# Patient Record
Sex: FEMALE | Race: BLACK OR AFRICAN AMERICAN | NOT HISPANIC OR LATINO | Employment: FULL TIME | URBAN - METROPOLITAN AREA
[De-identification: names, ages, dates, MRNs, and addresses within clinical notes are randomized per-mention and may not be internally consistent; named-entity substitution may affect disease eponyms.]

---

## 2023-09-14 LAB
ALBUMIN (G/DL) IN SER/PLAS: 3.9 G/DL (ref 3.4–5)
ANION GAP IN SER/PLAS: 14 MMOL/L (ref 10–20)
CALCIDIOL (25 OH VITAMIN D3) (NG/ML) IN SER/PLAS: 50 NG/ML
CALCIUM (MG/DL) IN SER/PLAS: 9.2 MG/DL (ref 8.6–10.6)
CARBON DIOXIDE, TOTAL (MMOL/L) IN SER/PLAS: 30 MMOL/L (ref 21–32)
CHLORIDE (MMOL/L) IN SER/PLAS: 101 MMOL/L (ref 98–107)
CREATININE (MG/DL) IN SER/PLAS: 0.86 MG/DL (ref 0.5–1.05)
DEHYDROEPIANDROSTERONE SULFATE (DHEA-S) (UG/DL) IN SER/: 68 UG/DL (ref 12–379)
ESTIMATED AVERAGE GLUCOSE FOR HBA1C: 114 MG/DL
ESTRADIOL (PG/ML) IN SER/PLAS: 52 PG/ML
FASTING GLUCOSE (MG/DL) IN SER/PLAS: 69 MG/DL (ref 74–99)
FOLLITROPIN (IU/L) IN SER/PLAS: 5.7 IU/L
GFR FEMALE: 83 ML/MIN/1.73M2
GLUCOSE (MG/DL) IN SER/PLAS: 73 MG/DL (ref 74–99)
HEMOGLOBIN A1C/HEMOGLOBIN TOTAL IN BLOOD: 5.6 %
LUTEINIZING HORMONE (IU/ML) IN SER/PLAS: 2.2 IU/L
PHOSPHATE (MG/DL) IN SER/PLAS: 4.8 MG/DL (ref 2.5–4.9)
POTASSIUM (MMOL/L) IN SER/PLAS: 3.5 MMOL/L (ref 3.5–5.3)
SODIUM (MMOL/L) IN SER/PLAS: 141 MMOL/L (ref 136–145)
THYROTROPIN (MIU/L) IN SER/PLAS BY DETECTION LIMIT <= 0.05 MIU/L: 0.91 MIU/L (ref 0.44–3.98)
THYROXINE (T4) FREE (NG/DL) IN SER/PLAS: 1.53 NG/DL (ref 0.78–1.48)
TRIIODOTHYRONINE (T3) (NG/DL) IN SER/PLAS: 139 NG/DL (ref 60–200)
UREA NITROGEN (MG/DL) IN SER/PLAS: 14 MG/DL (ref 6–23)

## 2023-09-16 LAB
THYROGLOBULIN AB (IU/ML) IN SER/PLAS: <0.9 IU/ML (ref 0–4)
THYROGLOBULIN LC-MS/MS: ABNORMAL NG/ML (ref 1.3–31.8)
THYROGLOBULIN: 0.1 NG/ML (ref 1.3–31.8)

## 2023-09-26 LAB
CLUE CELLS: PRESENT
NUGENT SCORE: 8
YEAST: ABNORMAL

## 2023-09-27 LAB
CHLAMYDIA TRACH., AMPLIFIED: NEGATIVE
N. GONORRHEA, AMPLIFIED: NEGATIVE
TRICHOMONAS VAGINALIS: NEGATIVE

## 2023-10-02 LAB — UREAPLASMA/MYCOPLASMA CULTURE: NORMAL

## 2024-03-12 ENCOUNTER — OFFICE VISIT (OUTPATIENT)
Dept: ENDOCRINOLOGY | Facility: CLINIC | Age: 49
End: 2024-03-12
Payer: COMMERCIAL

## 2024-03-12 ENCOUNTER — LAB (OUTPATIENT)
Dept: LAB | Facility: LAB | Age: 49
End: 2024-03-12
Payer: COMMERCIAL

## 2024-03-12 VITALS
SYSTOLIC BLOOD PRESSURE: 128 MMHG | TEMPERATURE: 97.7 F | DIASTOLIC BLOOD PRESSURE: 80 MMHG | HEIGHT: 65 IN | HEART RATE: 67 BPM | WEIGHT: 293 LBS | BODY MASS INDEX: 48.82 KG/M2

## 2024-03-12 DIAGNOSIS — E66.01 CLASS 3 SEVERE OBESITY WITH SERIOUS COMORBIDITY AND BODY MASS INDEX (BMI) OF 50.0 TO 59.9 IN ADULT, UNSPECIFIED OBESITY TYPE (MULTI): ICD-10-CM

## 2024-03-12 DIAGNOSIS — C56.9: Primary | ICD-10-CM

## 2024-03-12 DIAGNOSIS — C56.9: ICD-10-CM

## 2024-03-12 PROBLEM — E66.813 CLASS 3 SEVERE OBESITY WITH SERIOUS COMORBIDITY AND BODY MASS INDEX (BMI) OF 50.0 TO 59.9 IN ADULT: Status: ACTIVE | Noted: 2024-03-12

## 2024-03-12 LAB
EST. AVERAGE GLUCOSE BLD GHB EST-MCNC: 114 MG/DL
GLUCOSE P FAST SERPL-MCNC: 94 MG/DL (ref 74–99)
HBA1C MFR BLD: 5.6 %
T4 FREE SERPL-MCNC: 1.18 NG/DL (ref 0.78–1.48)
TSH SERPL-ACNC: 1.01 MIU/L (ref 0.44–3.98)

## 2024-03-12 PROCEDURE — 83036 HEMOGLOBIN GLYCOSYLATED A1C: CPT

## 2024-03-12 PROCEDURE — 36415 COLL VENOUS BLD VENIPUNCTURE: CPT

## 2024-03-12 PROCEDURE — 86800 THYROGLOBULIN ANTIBODY: CPT

## 2024-03-12 PROCEDURE — 84432 ASSAY OF THYROGLOBULIN: CPT

## 2024-03-12 PROCEDURE — 84439 ASSAY OF FREE THYROXINE: CPT

## 2024-03-12 PROCEDURE — 82947 ASSAY GLUCOSE BLOOD QUANT: CPT

## 2024-03-12 PROCEDURE — 3008F BODY MASS INDEX DOCD: CPT | Performed by: STUDENT IN AN ORGANIZED HEALTH CARE EDUCATION/TRAINING PROGRAM

## 2024-03-12 PROCEDURE — 99214 OFFICE O/P EST MOD 30 MIN: CPT | Performed by: STUDENT IN AN ORGANIZED HEALTH CARE EDUCATION/TRAINING PROGRAM

## 2024-03-12 PROCEDURE — 1036F TOBACCO NON-USER: CPT | Performed by: STUDENT IN AN ORGANIZED HEALTH CARE EDUCATION/TRAINING PROGRAM

## 2024-03-12 PROCEDURE — 84443 ASSAY THYROID STIM HORMONE: CPT

## 2024-03-12 RX ORDER — VIT C/E/ZN/COPPR/LUTEIN/ZEAXAN 250MG-90MG
2 CAPSULE ORAL DAILY
COMMUNITY
Start: 2023-03-14 | End: 2024-05-23 | Stop reason: SDUPTHER

## 2024-03-12 RX ORDER — ATORVASTATIN CALCIUM 10 MG/1
1 TABLET, FILM COATED ORAL DAILY
COMMUNITY
End: 2024-05-23 | Stop reason: SDUPTHER

## 2024-03-12 RX ORDER — LEVOTHYROXINE SODIUM 200 UG/1
1 TABLET ORAL DAILY
COMMUNITY
End: 2024-03-12 | Stop reason: SDUPTHER

## 2024-03-12 RX ORDER — BISMUTH SUBSALICYLATE 262 MG
1 TABLET,CHEWABLE ORAL DAILY
COMMUNITY

## 2024-03-12 RX ORDER — HYDROCORTISONE ACETATE 25 MG/1
1 SUPPOSITORY RECTAL EVERY 12 HOURS
COMMUNITY
Start: 2022-05-02

## 2024-03-12 RX ORDER — ERGOCALCIFEROL (VITAMIN D2) 50 MCG
50 CAPSULE ORAL DAILY
COMMUNITY
Start: 2024-01-18 | End: 2024-05-23 | Stop reason: WASHOUT

## 2024-03-12 RX ORDER — FLUTICASONE PROPIONATE 50 UG/1
1 SPRAY, METERED NASAL DAILY
COMMUNITY
Start: 2020-02-14

## 2024-03-12 RX ORDER — LEVOTHYROXINE SODIUM 200 UG/1
TABLET ORAL
Qty: 90 TABLET | Refills: 2 | Status: SHIPPED | OUTPATIENT
Start: 2024-03-12

## 2024-03-12 RX ORDER — PHENAZOPYRIDINE HYDROCHLORIDE 200 MG/1
1 TABLET, FILM COATED ORAL 3 TIMES DAILY PRN
COMMUNITY
End: 2024-05-23 | Stop reason: WASHOUT

## 2024-03-12 RX ORDER — SPIRONOLACTONE 50 MG/1
50 TABLET, FILM COATED ORAL DAILY
COMMUNITY
End: 2024-05-23 | Stop reason: SDUPTHER

## 2024-03-12 NOTE — PROGRESS NOTES
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  S/P 90 mCi of I-131 post MANDEL showed Uptake in neck and left adnexal region     Transvaginal ultrasound in September 2013:?  The right ovary has been removed and no right adnexal masses are seen.  The left ovary measures 4.6 x 2.8 x 3.4cm.  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   Abdominal:      Comments: Abdominal obesity   Musculoskeletal:         General: No swelling or tenderness.      Cervical back: Neck supple.   Skin:     General: Skin is warm.   Neurological:      General: No focal deficit present.      Mental Status: She is oriented to person, place, and time.         labs and imaging reviewed,  pertinent findings listed on HPI and Impression    Problem List Items Addressed This Visit       Malignant struma ovarii (CMS/[facility]) - Primary    Relevant Medications    levothyroxine (Synthroid, Levoxyl) 200 mcg tablet    Other Relevant Orders    Thyroxine, Free    Thyroid Stimulating Hormone    Thyroglobulin and Antithyroglobulin    Class 3 severe obesity with serious comorbidity and body mass index (BMI) of 50.0 to 59.9 in adult (CMS/[facility])    Relevant Orders    Hemoglobin A1C    Referral to Nutrition Services    Glucose, Fasting     1) 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  No contraindications to any orals, she will find out regarding her coverage and let us know     Follow up in 6 months

## 2024-03-12 NOTE — PATIENT INSTRUCTIONS
Ask your insurance if weight loss medications are covered under your plan:   Brands to ask about:     Pills:   Contrave   Qsymia     Injectibles:  Saxenda     I sent you for a nutrition referral   Get your labs done, fasting from midnight    Follow up in 6 months     Grecia Howard MD  Divison of Endocrinology   [facility]   Phone: [phone]    option 4, then option 1  Fax: [phone]

## 2024-03-14 LAB
BILL ONLY-THYROGLOBULIN: NORMAL
THYROGLOB AB SERPL-ACNC: <0.9 IU/ML (ref 0–4)
THYROGLOB SERPL-MCNC: <0.1 NG/ML (ref 1.3–31.8)
THYROGLOB SERPL-MCNC: ABNORMAL NG/ML (ref 1.3–31.8)

## 2024-04-23 ENCOUNTER — NUTRITION (OUTPATIENT)
Dept: PRIMARY CARE | Facility: CLINIC | Age: 49
End: 2024-04-23
Payer: COMMERCIAL

## 2024-04-23 VITALS — HEIGHT: 65 IN | BODY MASS INDEX: 58.91 KG/M2

## 2024-04-23 DIAGNOSIS — Z71.3 DIETARY COUNSELING AND SURVEILLANCE: Primary | ICD-10-CM

## 2024-04-23 DIAGNOSIS — E66.01 CLASS 3 SEVERE OBESITY WITH SERIOUS COMORBIDITY AND BODY MASS INDEX (BMI) OF 50.0 TO 59.9 IN ADULT, UNSPECIFIED OBESITY TYPE (MULTI): ICD-10-CM

## 2024-04-23 PROCEDURE — 97802 MEDICAL NUTRITION INDIV IN: CPT

## 2024-04-23 NOTE — PROGRESS NOTES
"Nutrition: Initial Assessment    Reason for Nutrition Visit: Patient is a 48 y.o. female referred for wt mgmt. Referred on 3/12/24 by Dr. Howard.       Nutrition Assessment    Food & Nutrition Related History: Pt \"Carlie\" presents in office. Works at [employer] as a manager. Has tried [employer], [employer], etc.     Dietary Considerations:  None  Allergies: None  Intolerance: None  Appetite: Good  Intake: >75%  GI Symptoms : None  Swallowing Difficulty: No problems with swallowing  Dentition : own  Food Preparation: Patient  Cooking Skills/Barriers: None reported  Grocery Shopping: Patient  Supplements: Vitamin D (? Dose), MVI   Food Insecurity: Denies     Dietary Recall:   Meal 1: breakfast bowl // often eating breakfast at work   Meal 2: spaghetti   Meal 3: PB&J uncrustable     Snacks: fruit, chips, Kind bars   Beverages: water, pop   (does not like diet pop)  Eating out: several times per week at [employer]   Alcohol Intake: occasional/social     Physical Activity: Has started walking     Labs:  Lab Results   Component Value Date    HGBA1C 5.6 03/12/2024    HGBA1C 5.6 09/14/2023    HGBA1C 5.6 05/09/2022     09/14/2023    K 3.5 09/14/2023     09/14/2023    CO2 30 09/14/2023    BUN 14 09/14/2023    CREATININE 0.86 09/14/2023    CALCIUM 9.2 09/14/2023    ALBUMIN 3.9 09/14/2023    PROT 7.7 08/29/2023    BILITOT 0.4 08/29/2023    ALKPHOS 114 (H) 08/29/2023    ALT 17 08/29/2023    AST 17 08/29/2023    GLUCOSE 73 (L) 09/14/2023   Comments: Vitamin D = normal (9/14/23).       Nutrition Focused Physical Exam:  Performed/Deferred: Deferred as pt visually appears well-nourished with no signs of malnutrition      Past Medical History:  Patient Active Problem List   Diagnosis    Malignant struma ovarii (Multi)    Class 3 severe obesity with serious comorbidity and body mass index (BMI) of 50.0 to 59.9 in adult (Multi)        Anthropometrics:  Ht Readings from Last 1 Encounters:   04/23/24 1.651 m (5' 5\")     BMI Readings " from Last 1 Encounters:   04/23/24 58.91 kg/m²     Wt Readings from Last 10 Encounters:   03/12/24 (!) 161 kg (354 lb)   09/12/23 (!) 161 kg (356 lb)     Significant weight change: No    Estimated Nutrition Needs:    Total Energy Estimated Needs (kCal): 1950 kCal   Method for Estimating Needs: MSJ 2237 x 1.2 - 750 (for weight loss)   Total Protein Estimated Needs (g): 95 g   Total Protein Estimated Needs (g/kg): 0.6 g/kg    Nutrition Diagnosis     Patient has Malnutrition Diagnosis: No     Patient has Nutrition Diagnosis: Yes    Nutrition Diagnosis 1: Excessive energy intake Related to (1): frequent fast food As Evidenced by (1): patient interview     Nutrition Interventions/Recommendations   FOOD & NUTRIENT DELIVERY: Decreased energy diet, Increased fiber diet    NUTRITION COUNSELING: Goal Setting    NUTRITION EDUCATION:   Provided Keys for a Healthy Lifestyle Handout. Discussed the following:  Start a daily exercise routine. Adults should target 150 minutes of moderate intensity exercise each week. Start slow and work your way up to this amount. Provided examples of aerobic, resistance, and stretching/balance activities.  Plan balanced meals. The “Plate Method” is a tool used to plan balanced meals. Aim for the following:  One-third to half the plate (or the meal) should be a non-starchy vegetable. Non-starchy vegetables include broccoli, cauliflower, salad greens, carrots, cucumbers, asparagus, green beans, tomatoes, and many more.  One-third to a quarter of the plate should be a lean protein. Lean proteins include chicken, turkey, fish, lean beef, eggs, nuts, tofu.  One third to a quarter of the plate can be a complex starch or carbohydrate. Examples of complex starches / carbohydrates include starchy vegetables (potatoes, peas, corn, and butternut squash), grains (whole wheat bread, quinoa, and brown rice), legumes (lentils and beans), and fruit.  Olive oil should be the primary fat source used for  "cooking.  Use a 9-10 inch plate to help manage portions  Pre-plan meal ideas. Spending time planning upfront saves you from relying on convenience foods. It can also help you save money! Your plan does not need to be rigid, but you should have some idea of what meals you are going to make going into the week. Place this information on the refrigerator in plain sight. There are many products you can purchase to help keep you organized.   Stay hydrated with water or other lower calorie beverages. We often confuse our body's signal for thirst with being hungry. Make sure you are staying hydrated, preferably with water. The best indicator of hydration is your urine. It should be a pale yellow. Provided examples of sugar-free beverages and ways to flavor water.     Educational Handouts: Keys for a Healthy Lifestyle    *Patient expressed understanding of the education provided and denied any additional questions/concerns.       Nutrition Monitoring and Evaluation   Nutrition Goals : Consistent meal/snack pattern  Decrease intake of added sugars  Decrease intake of saturated fats  Decrease sodium intake  Increase awareness and respond to hunger cues  Increase awareness and respond to satiety cues  Weight Loss    Patient's Goals:   1) Augusto will walk 2 days per week for 20-30 minutes  2) Augusto will use the \"Plate Method\" to plan balanced dinners  3) Augusto will research [employer] to see if this service would be helpful for meal prep  4) Augusto will limit her current pop intake to 2 cans per day    Readiness to Change : Excellent  Level of Understanding : Excellent  Anticipated Compliant : Excellent     Follow-up:  6 weeks    "

## 2024-05-01 ENCOUNTER — APPOINTMENT (OUTPATIENT)
Dept: PRIMARY CARE | Facility: CLINIC | Age: 49
End: 2024-05-01
Payer: COMMERCIAL

## 2024-05-22 ASSESSMENT — PROMIS GLOBAL HEALTH SCALE
RATE_GENERAL_HEALTH: GOOD
RATE_AVERAGE_PAIN: 3
RATE_AVERAGE_FATIGUE: MILD
RATE_QUALITY_OF_LIFE: GOOD
CARRYOUT_SOCIAL_ACTIVITIES: VERY GOOD
RATE_SOCIAL_SATISFACTION: VERY GOOD
RATE_PHYSICAL_HEALTH: FAIR
EMOTIONAL_PROBLEMS: NEVER
CARRYOUT_PHYSICAL_ACTIVITIES: MOSTLY
RATE_MENTAL_HEALTH: VERY GOOD

## 2024-05-23 ENCOUNTER — OFFICE VISIT (OUTPATIENT)
Dept: PRIMARY CARE | Facility: CLINIC | Age: 49
End: 2024-05-23
Payer: COMMERCIAL

## 2024-05-23 ENCOUNTER — APPOINTMENT (OUTPATIENT)
Dept: PRIMARY CARE | Facility: CLINIC | Age: 49
End: 2024-05-23
Payer: COMMERCIAL

## 2024-05-23 VITALS
SYSTOLIC BLOOD PRESSURE: 134 MMHG | RESPIRATION RATE: 16 BRPM | BODY MASS INDEX: 48.82 KG/M2 | WEIGHT: 293 LBS | HEART RATE: 66 BPM | OXYGEN SATURATION: 98 % | TEMPERATURE: 98 F | HEIGHT: 65 IN | DIASTOLIC BLOOD PRESSURE: 85 MMHG

## 2024-05-23 DIAGNOSIS — I10 PRIMARY HYPERTENSION: Primary | ICD-10-CM

## 2024-05-23 DIAGNOSIS — R06.2 NOCTURNAL COUGH WITH WHEEZE: ICD-10-CM

## 2024-05-23 DIAGNOSIS — E78.5 HYPERLIPIDEMIA, UNSPECIFIED HYPERLIPIDEMIA TYPE: ICD-10-CM

## 2024-05-23 DIAGNOSIS — R05.3 CHRONIC COUGH: ICD-10-CM

## 2024-05-23 DIAGNOSIS — E55.9 VITAMIN D DEFICIENCY: ICD-10-CM

## 2024-05-23 DIAGNOSIS — R05.8 NOCTURNAL COUGH WITH WHEEZE: ICD-10-CM

## 2024-05-23 PROCEDURE — 99204 OFFICE O/P NEW MOD 45 MIN: CPT | Performed by: STUDENT IN AN ORGANIZED HEALTH CARE EDUCATION/TRAINING PROGRAM

## 2024-05-23 PROCEDURE — 3075F SYST BP GE 130 - 139MM HG: CPT | Performed by: STUDENT IN AN ORGANIZED HEALTH CARE EDUCATION/TRAINING PROGRAM

## 2024-05-23 PROCEDURE — 90471 IMMUNIZATION ADMIN: CPT | Performed by: STUDENT IN AN ORGANIZED HEALTH CARE EDUCATION/TRAINING PROGRAM

## 2024-05-23 PROCEDURE — 3079F DIAST BP 80-89 MM HG: CPT | Performed by: STUDENT IN AN ORGANIZED HEALTH CARE EDUCATION/TRAINING PROGRAM

## 2024-05-23 PROCEDURE — 90715 TDAP VACCINE 7 YRS/> IM: CPT | Performed by: STUDENT IN AN ORGANIZED HEALTH CARE EDUCATION/TRAINING PROGRAM

## 2024-05-23 PROCEDURE — 3008F BODY MASS INDEX DOCD: CPT | Performed by: STUDENT IN AN ORGANIZED HEALTH CARE EDUCATION/TRAINING PROGRAM

## 2024-05-23 RX ORDER — SPIRONOLACTONE 50 MG/1
50 TABLET, FILM COATED ORAL DAILY
Qty: 90 TABLET | Refills: 0 | Status: SHIPPED | OUTPATIENT
Start: 2024-05-23 | End: 2024-08-21

## 2024-05-23 RX ORDER — METOPROLOL TARTRATE 25 MG/1
1 TABLET, FILM COATED ORAL DAILY
COMMUNITY
Start: 2022-06-30 | End: 2024-05-23 | Stop reason: SDUPTHER

## 2024-05-23 RX ORDER — ATORVASTATIN CALCIUM 10 MG/1
10 TABLET, FILM COATED ORAL DAILY
Qty: 90 TABLET | Refills: 0 | Status: SHIPPED | OUTPATIENT
Start: 2024-05-23 | End: 2024-08-21

## 2024-05-23 RX ORDER — VIT C/E/ZN/COPPR/LUTEIN/ZEAXAN 250MG-90MG
50 CAPSULE ORAL DAILY
Qty: 60 CAPSULE | Refills: 2 | Status: SHIPPED | OUTPATIENT
Start: 2024-05-23 | End: 2024-08-21

## 2024-05-23 RX ORDER — ALBUTEROL SULFATE 90 UG/1
2 AEROSOL, METERED RESPIRATORY (INHALATION) EVERY 4 HOURS PRN
Qty: 8 G | Refills: 5 | Status: SHIPPED | OUTPATIENT
Start: 2024-05-23 | End: 2025-05-23

## 2024-05-23 RX ORDER — METOPROLOL TARTRATE 25 MG/1
25 TABLET, FILM COATED ORAL DAILY
Qty: 90 TABLET | Refills: 0 | Status: SHIPPED | OUTPATIENT
Start: 2024-05-23 | End: 2024-08-21

## 2024-05-23 ASSESSMENT — ENCOUNTER SYMPTOMS
FEVER: 0
HEMATURIA: 0
WEAKNESS: 0
ABDOMINAL PAIN: 0
ACTIVITY CHANGE: 0
DYSURIA: 0
NUMBNESS: 0
VOMITING: 0
DIZZINESS: 0
COUGH: 0
CONSTIPATION: 0
WHEEZING: 0
NAUSEA: 0
SPEECH DIFFICULTY: 0
SHORTNESS OF BREATH: 0

## 2024-05-23 NOTE — PROGRESS NOTES
"Subjective   Patient ID: Augusto Garcia is a 48 y.o. female who presents for Annual Exam.  HPI  Augusto Garcia is 48 y.o. is here to establish care    Chronic active medical problems     Hypothyroid and Malogn struma ovari  following with endocrinology  on levothyroxine 200  Hypertension spironolactone, metoprolol 25 bid  Obesity   Atorvastatin   Flonase     Past surgeries 2012- ovarian cyst , thyroidecotmy     Allergies penicllin, metrogyl     T X   Alc social   X drug     FH   Dm: mom, father   Htn: mom  Lung cancer father - smoke/ asbestos   Br cancer - mom            No concerns today.   Cancer screening  1Pap smear  9/25/2023     -negative for malignancy with negative HPV   Colonoscopy-due 5/2022 negative FIT  colonoscopy next year    Mammogram- sept 2023      Immunizations  Tdap - due               Weight  concerns seeining a nutritionist   Reports occ wheezing and mildly productive cough leroy after urti. No symptoms now    No past medical history on file.   No past surgical history on file.   No family history on file.   Allergies   Allergen Reactions    Metronidazole Itching and Swelling    Penicillins Rash          Occupation:     Review of Systems   Constitutional:  Negative for activity change and fever.   HENT:  Negative for congestion.    Respiratory:  Negative for cough, shortness of breath and wheezing.    Cardiovascular:  Negative for chest pain and leg swelling.   Gastrointestinal:  Negative for abdominal pain, constipation, nausea and vomiting.   Endocrine: Negative for cold intolerance.   Genitourinary:  Negative for dysuria, hematuria and urgency.   Neurological:  Negative for dizziness, speech difficulty, weakness and numbness.   Psychiatric/Behavioral:  Negative for self-injury and suicidal ideas.        Objective   Visit Vitals  /85   Pulse 66   Temp 36.7 °C (98 °F)   Resp 16   Ht 1.651 m (5' 5\")   Wt (!) 162 kg (358 lb)   SpO2 98%   BMI 59.57 kg/m²   Smoking Status Never   BSA 2.73 m²    "   Physical Exam  Constitutional:       Appearance: Normal appearance.   HENT:      Head: Normocephalic and atraumatic.      Nose: Nose normal.      Mouth/Throat:      Mouth: Mucous membranes are moist.   Eyes:      Conjunctiva/sclera: Conjunctivae normal.      Pupils: Pupils are equal, round, and reactive to light.   Cardiovascular:      Rate and Rhythm: Normal rate and regular rhythm.      Pulses: Normal pulses.      Heart sounds: Normal heart sounds.   Pulmonary:      Effort: Pulmonary effort is normal.      Breath sounds: Normal breath sounds.   Musculoskeletal:         General: Normal range of motion.      Cervical back: Neck supple.   Skin:     General: Skin is warm.   Neurological:      General: No focal deficit present.      Mental Status: She is alert and oriented to person, place, and time.   Psychiatric:         Mood and Affect: Mood normal.         Behavior: Behavior normal.         Thought Content: Thought content normal.         Judgment: Judgment normal.         Assessment/Plan   Diagnoses and all orders for this visit:  Primary hypertension  Continue s metoprolol tartrate (Lopressor) 25 mg tablet; Take 1 tablet (25 mg) by mouth once daily.  -     spironolactone (Aldactone) 50 mg tablet; Take 1 tablet (50 mg) by mouth once daily. Home bp monitoring; refilled   -     CBC; Future  -     Comprehensive Metabolic Panel; Future  -     Lipid Panel; Future  -     Tdap vaccine, age 7 years and older  (BOOSTRIX)  -     metoprolol tartrate (Lopressor) 25 mg tablet; Take 1 tablet (25 mg) by mouth once daily.  -     spironolactone (Aldactone) 50 mg tablet; Take 1 tablet (50 mg) by mouth once daily.  Vitamin D deficiency  -     Vitamin D 25 hydroxy; Future  -     cholecalciferol (Vitamin D-3) 25 MCG (1000 UT) capsule; Take 2 capsules (50 mcg) by mouth once daily.  Hyperlipidemia, unspecified hyperlipidemia type  -     atorvastatin (Lipitor) 10 mg tablet; Take 1 tablet (10 mg) by mouth once daily.  Repeat lipi panel    Lifetsyls modification      We will call the patient with abnormal labs if any and discuss necessary changes based on labs; otherwise patient to follow up in 3 months for HTN/ HLD  and in 1 year for next physical exam Patient to seek medical attention immediately / call 911  if has worsening of symptoms  or develops alarm symptoms as discussed. Verbalizes understanding

## 2024-06-04 ENCOUNTER — APPOINTMENT (OUTPATIENT)
Dept: PRIMARY CARE | Facility: CLINIC | Age: 49
End: 2024-06-04
Payer: COMMERCIAL

## 2024-06-11 ENCOUNTER — HOSPITAL ENCOUNTER (OUTPATIENT)
Dept: RESPIRATORY THERAPY | Facility: CLINIC | Age: 49
End: 2024-06-11
Payer: COMMERCIAL

## 2024-06-17 ENCOUNTER — APPOINTMENT (OUTPATIENT)
Dept: RESPIRATORY THERAPY | Facility: CLINIC | Age: 49
End: 2024-06-17
Payer: COMMERCIAL

## 2024-07-01 ENCOUNTER — HOSPITAL ENCOUNTER (OUTPATIENT)
Dept: RESPIRATORY THERAPY | Facility: CLINIC | Age: 49
Discharge: HOME | End: 2024-07-01
Payer: COMMERCIAL

## 2024-07-01 DIAGNOSIS — R05.8 NOCTURNAL COUGH WITH WHEEZE: ICD-10-CM

## 2024-07-01 DIAGNOSIS — R06.2 NOCTURNAL COUGH WITH WHEEZE: ICD-10-CM

## 2024-07-01 PROCEDURE — 94729 DIFFUSING CAPACITY: CPT

## 2024-07-01 PROCEDURE — 94727 GAS DIL/WSHOT DETER LNG VOL: CPT | Performed by: STUDENT IN AN ORGANIZED HEALTH CARE EDUCATION/TRAINING PROGRAM

## 2024-07-01 PROCEDURE — 94727 GAS DIL/WSHOT DETER LNG VOL: CPT

## 2024-07-01 PROCEDURE — 94060 EVALUATION OF WHEEZING: CPT | Performed by: STUDENT IN AN ORGANIZED HEALTH CARE EDUCATION/TRAINING PROGRAM

## 2024-07-01 PROCEDURE — 94060 EVALUATION OF WHEEZING: CPT

## 2024-07-11 ENCOUNTER — TELEPHONE (OUTPATIENT)
Dept: OBSTETRICS AND GYNECOLOGY | Facility: CLINIC | Age: 49
End: 2024-07-11
Payer: COMMERCIAL

## 2024-07-11 DIAGNOSIS — E55.9 VITAMIN D DEFICIENCY: ICD-10-CM

## 2024-07-11 NOTE — TELEPHONE ENCOUNTER
Patient states she has an IUD that has  in April she would like to know if she should be seen before her appointment in October

## 2024-07-13 RX ORDER — VIT C/E/ZN/COPPR/LUTEIN/ZEAXAN 250MG-90MG
50 CAPSULE ORAL DAILY
Qty: 180 CAPSULE | Refills: 0 | Status: SHIPPED | OUTPATIENT
Start: 2024-07-13

## 2024-07-16 NOTE — TELEPHONE ENCOUNTER
I di some chart review and it looks like her IUD was placed in April 2019 - it may remain in place for up to 8 years.

## 2024-08-12 ENCOUNTER — LAB (OUTPATIENT)
Dept: LAB | Facility: LAB | Age: 49
End: 2024-08-12
Payer: COMMERCIAL

## 2024-08-12 DIAGNOSIS — E55.9 VITAMIN D DEFICIENCY: ICD-10-CM

## 2024-08-12 DIAGNOSIS — I10 PRIMARY HYPERTENSION: ICD-10-CM

## 2024-08-12 LAB
25(OH)D3 SERPL-MCNC: 33 NG/ML (ref 30–100)
ALBUMIN SERPL BCP-MCNC: 3.6 G/DL (ref 3.4–5)
ALP SERPL-CCNC: 111 U/L (ref 33–110)
ALT SERPL W P-5'-P-CCNC: 12 U/L (ref 7–45)
ANION GAP SERPL CALC-SCNC: 10 MMOL/L (ref 10–20)
AST SERPL W P-5'-P-CCNC: 12 U/L (ref 9–39)
BILIRUB SERPL-MCNC: 0.5 MG/DL (ref 0–1.2)
BUN SERPL-MCNC: 10 MG/DL (ref 6–23)
CALCIUM SERPL-MCNC: 8.9 MG/DL (ref 8.6–10.6)
CHLORIDE SERPL-SCNC: 98 MMOL/L (ref 98–107)
CHOLEST SERPL-MCNC: 147 MG/DL (ref 0–199)
CHOLESTEROL/HDL RATIO: 3.8
CO2 SERPL-SCNC: 32 MMOL/L (ref 21–32)
CREAT SERPL-MCNC: 0.79 MG/DL (ref 0.5–1.05)
EGFRCR SERPLBLD CKD-EPI 2021: >90 ML/MIN/1.73M*2
ERYTHROCYTE [DISTWIDTH] IN BLOOD BY AUTOMATED COUNT: 13.4 % (ref 11.5–14.5)
GLUCOSE SERPL-MCNC: 104 MG/DL (ref 74–99)
HCT VFR BLD AUTO: 40 % (ref 36–46)
HDLC SERPL-MCNC: 38.4 MG/DL
HGB BLD-MCNC: 12.8 G/DL (ref 12–16)
LDLC SERPL CALC-MCNC: 92 MG/DL
MCH RBC QN AUTO: 29.2 PG (ref 26–34)
MCHC RBC AUTO-ENTMCNC: 32 G/DL (ref 32–36)
MCV RBC AUTO: 91 FL (ref 80–100)
NON HDL CHOLESTEROL: 109 MG/DL (ref 0–149)
NRBC BLD-RTO: 0 /100 WBCS (ref 0–0)
PLATELET # BLD AUTO: 243 X10*3/UL (ref 150–450)
POTASSIUM SERPL-SCNC: 3.8 MMOL/L (ref 3.5–5.3)
PROT SERPL-MCNC: 7.3 G/DL (ref 6.4–8.2)
RBC # BLD AUTO: 4.39 X10*6/UL (ref 4–5.2)
SODIUM SERPL-SCNC: 136 MMOL/L (ref 136–145)
TRIGL SERPL-MCNC: 83 MG/DL (ref 0–149)
VLDL: 17 MG/DL (ref 0–40)
WBC # BLD AUTO: 7.7 X10*3/UL (ref 4.4–11.3)

## 2024-08-12 PROCEDURE — 82306 VITAMIN D 25 HYDROXY: CPT

## 2024-08-12 PROCEDURE — 80061 LIPID PANEL: CPT

## 2024-08-12 PROCEDURE — 80053 COMPREHEN METABOLIC PANEL: CPT

## 2024-08-12 PROCEDURE — 85027 COMPLETE CBC AUTOMATED: CPT

## 2024-08-12 PROCEDURE — 36415 COLL VENOUS BLD VENIPUNCTURE: CPT

## 2024-08-21 ASSESSMENT — PROMIS GLOBAL HEALTH SCALE
RATE_MENTAL_HEALTH: GOOD
EMOTIONAL_PROBLEMS: RARELY
CARRYOUT_SOCIAL_ACTIVITIES: GOOD
RATE_QUALITY_OF_LIFE: GOOD
CARRYOUT_PHYSICAL_ACTIVITIES: MOSTLY
RATE_PHYSICAL_HEALTH: FAIR
RATE_AVERAGE_PAIN: 3
RATE_SOCIAL_SATISFACTION: GOOD
RATE_GENERAL_HEALTH: GOOD
RATE_AVERAGE_FATIGUE: MILD

## 2024-08-23 ENCOUNTER — LAB (OUTPATIENT)
Dept: LAB | Facility: LAB | Age: 49
End: 2024-08-23
Payer: COMMERCIAL

## 2024-08-23 ENCOUNTER — APPOINTMENT (OUTPATIENT)
Dept: PRIMARY CARE | Facility: CLINIC | Age: 49
End: 2024-08-23
Payer: COMMERCIAL

## 2024-08-23 VITALS
OXYGEN SATURATION: 94 % | SYSTOLIC BLOOD PRESSURE: 135 MMHG | DIASTOLIC BLOOD PRESSURE: 85 MMHG | BODY MASS INDEX: 48.82 KG/M2 | WEIGHT: 293 LBS | HEART RATE: 69 BPM | HEIGHT: 65 IN

## 2024-08-23 DIAGNOSIS — R40.0 DAYTIME SLEEPINESS: ICD-10-CM

## 2024-08-23 DIAGNOSIS — E78.5 HYPERLIPIDEMIA, UNSPECIFIED HYPERLIPIDEMIA TYPE: ICD-10-CM

## 2024-08-23 DIAGNOSIS — R06.83 SNORING: Primary | ICD-10-CM

## 2024-08-23 DIAGNOSIS — E66.01 CLASS 3 SEVERE OBESITY WITH SERIOUS COMORBIDITY AND BODY MASS INDEX (BMI) OF 50.0 TO 59.9 IN ADULT, UNSPECIFIED OBESITY TYPE (MULTI): ICD-10-CM

## 2024-08-23 DIAGNOSIS — I10 PRIMARY HYPERTENSION: ICD-10-CM

## 2024-08-23 PROCEDURE — 80307 DRUG TEST PRSMV CHEM ANLYZR: CPT

## 2024-08-23 RX ORDER — SPIRONOLACTONE 50 MG/1
50 TABLET, FILM COATED ORAL DAILY
Qty: 90 TABLET | Refills: 0 | Status: SHIPPED | OUTPATIENT
Start: 2024-08-23 | End: 2024-11-21

## 2024-08-23 RX ORDER — ATORVASTATIN CALCIUM 10 MG/1
10 TABLET, FILM COATED ORAL DAILY
Qty: 90 TABLET | Refills: 0 | Status: SHIPPED | OUTPATIENT
Start: 2024-08-23 | End: 2024-11-21

## 2024-08-23 RX ORDER — METOPROLOL TARTRATE 25 MG/1
25 TABLET, FILM COATED ORAL 2 TIMES DAILY
Qty: 180 TABLET | Refills: 0 | Status: SHIPPED | OUTPATIENT
Start: 2024-08-23 | End: 2024-11-21

## 2024-08-23 NOTE — PROGRESS NOTES
"Subjective   Patient ID: Augusto Garcia is a 48 y.o. female who presents for Annual Exam.  HPI  Patient here for annual exam.  Has weight concerns  Medical history significant for  1.  Hypertension on metoprolol 25 mg, Aldactone 50 mg  Hyperlipidemia on atorvastatin 10 mg  Hypothyroid and Malogn struma ovari following with endocrinology on levothyroxine 200     No past medical history on file.   No past surgical history on file.   No family history on file.   Allergies   Allergen Reactions    Metronidazole Itching and Swelling    Penicillins Rash          Occupation:     Review of Systems   Constitutional:  Negative for activity change and fever.   HENT:  Negative for congestion.    Respiratory:  Negative for cough, shortness of breath and wheezing.    Cardiovascular:  Negative for chest pain and leg swelling.   Gastrointestinal:  Negative for abdominal pain, constipation, nausea and vomiting.   Endocrine: Negative for cold intolerance.   Genitourinary:  Negative for dysuria, hematuria and urgency.   Neurological:  Negative for dizziness, speech difficulty, weakness and numbness.   Psychiatric/Behavioral:  Negative for self-injury and suicidal ideas.        Objective   Visit Vitals  /85   Pulse 69   Ht 1.651 m (5' 5\")   Wt (!) 162 kg (357 lb)   SpO2 94%   BMI 59.41 kg/m²   Smoking Status Never   BSA 2.73 m²      Physical Exam  HENT:      Head: Normocephalic and atraumatic.      Right Ear: Tympanic membrane normal.      Left Ear: Tympanic membrane normal.      Nose: Nose normal.      Mouth/Throat:      Mouth: Mucous membranes are moist.   Eyes:      Extraocular Movements: Extraocular movements intact.      Conjunctiva/sclera: Conjunctivae normal.      Pupils: Pupils are equal, round, and reactive to light.   Cardiovascular:      Rate and Rhythm: Normal rate and regular rhythm.      Pulses: Normal pulses.      Heart sounds: Normal heart sounds.   Pulmonary:      Effort: Pulmonary effort is normal.      Breath " sounds: Normal breath sounds. No stridor. No rhonchi.   Abdominal:      General: Bowel sounds are normal.      Palpations: Abdomen is soft.      Tenderness: There is no abdominal tenderness. There is no guarding or rebound.   Musculoskeletal:      Cervical back: Neck supple.   Neurological:      Mental Status: She is oriented to person, place, and time.   Psychiatric:         Mood and Affect: Mood normal.         Behavior: Behavior normal.         Assessment/Plan   Diagnoses and all orders for this visit:  Snoring  Does endorse snoring, along with daytime sleepiness.  No observed sleep apnea.  Given the elevated BMI, possible sleep apnea.  Will get a sleep study for this  -     In-Center Sleep Study (Non-Sleep Provider Only); Future  Primary hypertension  -     metoprolol tartrate (Lopressor) 25 mg tablet; Take 1 tablet (25 mg) by mouth 2 times a day.  -     spironolactone (Aldactone) 50 mg tablet; Take 1 tablet (50 mg) by mouth once daily.  -     In-Center Sleep Study (Non-Sleep Provider Only); Future  Hyperlipidemia, unspecified hyperlipidemia type  -     atorvastatin (Lipitor) 10 mg tablet; Take 1 tablet (10 mg) by mouth once daily.  -     In-Center Sleep Study (Non-Sleep Provider Only); Future  Daytime sleepiness  -     In-Center Sleep Study (Non-Sleep Provider Only); Future  Class 3 severe obesity with serious comorbidity and body mass index (BMI) of 50.0 to 59.9 in adult, unspecified obesity type (Multi)  We discussed various options including pharmacological and nonpharmacological.  Patient agreeable to get phentermine.  Benefits risks discussed.  Verbalizes understanding agreeable to plan  -     Drug Screen, Urine With Reflex to Confirmation; Future     Controlled substance agreement signed with me today

## 2024-08-24 LAB
AMPHETAMINES UR QL SCN: NORMAL
BARBITURATES UR QL SCN: NORMAL
BENZODIAZ UR QL SCN: NORMAL
BZE UR QL SCN: NORMAL
CANNABINOIDS UR QL SCN: NORMAL
FENTANYL+NORFENTANYL UR QL SCN: NORMAL
METHADONE UR QL SCN: NORMAL
OPIATES UR QL SCN: NORMAL
OXYCODONE+OXYMORPHONE UR QL SCN: NORMAL
PCP UR QL SCN: NORMAL

## 2024-08-30 DIAGNOSIS — E66.01 CLASS 3 SEVERE OBESITY WITH SERIOUS COMORBIDITY AND BODY MASS INDEX (BMI) OF 50.0 TO 59.9 IN ADULT, UNSPECIFIED OBESITY TYPE (MULTI): Primary | ICD-10-CM

## 2024-08-30 RX ORDER — PHENTERMINE HYDROCHLORIDE 37.5 MG/1
37.5 TABLET ORAL
Qty: 30 TABLET | Refills: 0 | Status: SHIPPED | OUTPATIENT
Start: 2024-08-30 | End: 2024-09-29

## 2024-08-30 ASSESSMENT — ENCOUNTER SYMPTOMS
HEMATURIA: 0
NUMBNESS: 0
ABDOMINAL PAIN: 0
VOMITING: 0
COUGH: 0
DIZZINESS: 0
WEAKNESS: 0
SHORTNESS OF BREATH: 0
NAUSEA: 0
DYSURIA: 0
WHEEZING: 0
FEVER: 0
SPEECH DIFFICULTY: 0
ACTIVITY CHANGE: 0
CONSTIPATION: 0

## 2024-09-12 ENCOUNTER — APPOINTMENT (OUTPATIENT)
Dept: ENDOCRINOLOGY | Facility: CLINIC | Age: 49
End: 2024-09-12
Payer: COMMERCIAL

## 2024-09-12 ENCOUNTER — LAB (OUTPATIENT)
Dept: LAB | Facility: LAB | Age: 49
End: 2024-09-12
Payer: COMMERCIAL

## 2024-09-12 VITALS
TEMPERATURE: 97.7 F | HEIGHT: 65 IN | WEIGHT: 293 LBS | SYSTOLIC BLOOD PRESSURE: 143 MMHG | HEART RATE: 83 BPM | BODY MASS INDEX: 48.82 KG/M2 | DIASTOLIC BLOOD PRESSURE: 85 MMHG

## 2024-09-12 DIAGNOSIS — C56.9: ICD-10-CM

## 2024-09-12 DIAGNOSIS — E89.0 POST-SURGICAL HYPOTHYROIDISM: ICD-10-CM

## 2024-09-12 DIAGNOSIS — E89.0 POST-SURGICAL HYPOTHYROIDISM: Primary | ICD-10-CM

## 2024-09-12 LAB
T4 FREE SERPL-MCNC: 1.76 NG/DL (ref 0.78–1.48)
TSH SERPL-ACNC: 0.37 MIU/L (ref 0.44–3.98)

## 2024-09-12 PROCEDURE — 84432 ASSAY OF THYROGLOBULIN: CPT

## 2024-09-12 PROCEDURE — 36415 COLL VENOUS BLD VENIPUNCTURE: CPT

## 2024-09-12 PROCEDURE — 84439 ASSAY OF FREE THYROXINE: CPT

## 2024-09-12 PROCEDURE — 84443 ASSAY THYROID STIM HORMONE: CPT

## 2024-09-12 PROCEDURE — 99213 OFFICE O/P EST LOW 20 MIN: CPT | Performed by: STUDENT IN AN ORGANIZED HEALTH CARE EDUCATION/TRAINING PROGRAM

## 2024-09-12 PROCEDURE — 1036F TOBACCO NON-USER: CPT | Performed by: STUDENT IN AN ORGANIZED HEALTH CARE EDUCATION/TRAINING PROGRAM

## 2024-09-12 PROCEDURE — 86800 THYROGLOBULIN ANTIBODY: CPT

## 2024-09-12 PROCEDURE — 3008F BODY MASS INDEX DOCD: CPT | Performed by: STUDENT IN AN ORGANIZED HEALTH CARE EDUCATION/TRAINING PROGRAM

## 2024-09-12 RX ORDER — LEVOTHYROXINE SODIUM 200 UG/1
TABLET ORAL
Qty: 90 TABLET | Refills: 2 | Status: SHIPPED | OUTPATIENT
Start: 2024-09-12

## 2024-09-12 NOTE — PROGRESS NOTES
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  S/P 90 mCi of I-131 post MANDEL showed Uptake in neck and left adnexal region     Transvaginal ultrasound in September 2013:?  The right ovary has been removed and no right adnexal masses are seen.  The left ovary measures 4.6 x 2.8 x 3.4cm.  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   Abdominal:      Comments: Abdominal obesity   Musculoskeletal:         General: No swelling or tenderness.      Cervical back: Neck supple.   Skin:     General: Skin is warm.   Neurological:      General: No focal deficit present.      Mental Status:  She is oriented to person, place, and time.      Comments: No delay in relaxation in DTR          labs and imaging reviewed, pertinent findings listed on HPI and Impression    Problem List Items Addressed This Visit       Malignant struma ovarii (Multi)    Relevant Medications    levothyroxine (Synthroid, Levoxyl) 200 mcg tablet    Other Relevant Orders    Thyroid Stimulating Hormone    Thyroxine, Free    Thyroglobulin and Antithyroglobulin     Other Visit Diagnoses       Post-surgical hypothyroidism    -  Primary    Relevant Medications    levothyroxine (Synthroid, Levoxyl) 200 mcg tablet    Other Relevant Orders    Thyroid Stimulating Hormone    Thyroxine, Free    Thyroglobulin and Antithyroglobulin            1) 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

## 2024-09-12 NOTE — LETTER
September 12, 2024     Patient: Augusto Garcia   YOB: 1975   Date of Visit: 9/12/2024       To Whom It May Concern:    Augusto Garcia was seen in my clinic on 9/12/2024 at 8:20 am. Please excuse Augusto for her absence from school on this day to make the appointment.    If you have any questions or concerns, please don't hesitate to call.         Sincerely,         Grecia Howard MD        CC: No Recipients

## 2024-09-12 NOTE — PATIENT INSTRUCTIONS
Get your labs done    Continue levothyroxine     If the labs are good, then we can do yearly follow up    Grecia Howard MD  Divison of Endocrinology   [facility]   Phone: [phone]    option 4, then option 1  Fax: [phone]

## 2024-09-18 ENCOUNTER — APPOINTMENT (OUTPATIENT)
Dept: SLEEP MEDICINE | Facility: CLINIC | Age: 49
End: 2024-09-18
Payer: COMMERCIAL

## 2024-09-18 DIAGNOSIS — I10 PRIMARY HYPERTENSION: ICD-10-CM

## 2024-09-19 RX ORDER — METOPROLOL TARTRATE 25 MG/1
25 TABLET, FILM COATED ORAL DAILY
Qty: 30 TABLET | Refills: 2 | Status: SHIPPED | OUTPATIENT
Start: 2024-09-19

## 2024-09-23 DIAGNOSIS — C56.9: Primary | ICD-10-CM

## 2024-09-23 RX ORDER — LEVOTHYROXINE SODIUM 175 UG/1
175 TABLET ORAL DAILY
Qty: 90 TABLET | Refills: 3 | Status: SHIPPED | OUTPATIENT
Start: 2024-09-23 | End: 2025-09-23

## 2024-09-26 ENCOUNTER — APPOINTMENT (OUTPATIENT)
Dept: PRIMARY CARE | Facility: CLINIC | Age: 49
End: 2024-09-26
Payer: COMMERCIAL

## 2024-09-26 VITALS
SYSTOLIC BLOOD PRESSURE: 143 MMHG | DIASTOLIC BLOOD PRESSURE: 92 MMHG | WEIGHT: 293 LBS | HEART RATE: 79 BPM | OXYGEN SATURATION: 96 % | BODY MASS INDEX: 60.41 KG/M2 | RESPIRATION RATE: 18 BRPM

## 2024-09-26 DIAGNOSIS — E78.5 HYPERLIPIDEMIA, UNSPECIFIED HYPERLIPIDEMIA TYPE: ICD-10-CM

## 2024-09-26 DIAGNOSIS — E66.01 CLASS 3 SEVERE OBESITY WITH SERIOUS COMORBIDITY AND BODY MASS INDEX (BMI) OF 50.0 TO 59.9 IN ADULT, UNSPECIFIED OBESITY TYPE: Primary | ICD-10-CM

## 2024-09-26 DIAGNOSIS — E66.813 CLASS 3 SEVERE OBESITY WITH SERIOUS COMORBIDITY AND BODY MASS INDEX (BMI) OF 50.0 TO 59.9 IN ADULT, UNSPECIFIED OBESITY TYPE: Primary | ICD-10-CM

## 2024-09-26 DIAGNOSIS — I10 PRIMARY HYPERTENSION: ICD-10-CM

## 2024-09-26 PROCEDURE — 99214 OFFICE O/P EST MOD 30 MIN: CPT | Performed by: STUDENT IN AN ORGANIZED HEALTH CARE EDUCATION/TRAINING PROGRAM

## 2024-09-26 PROCEDURE — 3077F SYST BP >= 140 MM HG: CPT | Performed by: STUDENT IN AN ORGANIZED HEALTH CARE EDUCATION/TRAINING PROGRAM

## 2024-09-26 PROCEDURE — 3080F DIAST BP >= 90 MM HG: CPT | Performed by: STUDENT IN AN ORGANIZED HEALTH CARE EDUCATION/TRAINING PROGRAM

## 2024-09-26 ASSESSMENT — ENCOUNTER SYMPTOMS
OCCASIONAL FEELINGS OF UNSTEADINESS: 0
LOSS OF SENSATION IN FEET: 0
DEPRESSION: 0

## 2024-09-26 ASSESSMENT — PATIENT HEALTH QUESTIONNAIRE - PHQ9
1. LITTLE INTEREST OR PLEASURE IN DOING THINGS: NOT AT ALL
SUM OF ALL RESPONSES TO PHQ9 QUESTIONS 1 AND 2: 0
2. FEELING DOWN, DEPRESSED OR HOPELESS: NOT AT ALL

## 2024-09-26 ASSESSMENT — PAIN SCALES - GENERAL: PAINLEVEL: 0-NO PAIN

## 2024-09-26 NOTE — PROGRESS NOTES
Subjective   Patient ID: Augusto Garcia is a 48 y.o. female who presents for Follow-up and Medication Problem.  HPI  Patient previously given phentermine by me.  Reports side effects including palpitations.  Did not complete the course.  Requests other options.  Discussed other nonpharmacological and pharmacological options.  Would like to see fitter me program.    Other medical problems include  Medical history significant for  1.  Hypertension on metoprolol 25 mg, Aldactone 50 mg  Hyperlipidemia on atorvastatin 10 mg  Hypothyroid and Malogn struma ovari following with endocrinology on levothyroxine 200   Daytime sleepiness:   In-Center Sleep Study-to be done order placed      Cancer screening  1Pap smear  9/25/2023     -negative for malignancy with negative HPV   Colonoscopy-due 5/2022 negative FIT  colonoscopy next year    Mammogram- sept 2023       No past medical history on file.   No past surgical history on file.   No family history on file.   Allergies   Allergen Reactions    Metronidazole Itching and Swelling    Penicillins Rash          Occupation:     Review of Systems   Constitutional:  Negative for activity change and fever.   HENT:  Negative for congestion.    Respiratory:  Negative for cough, shortness of breath and wheezing.    Cardiovascular:  Negative for chest pain and leg swelling.   Gastrointestinal:  Negative for abdominal pain, constipation, nausea and vomiting.   Endocrine: Negative for cold intolerance.   Genitourinary:  Negative for dysuria, hematuria and urgency.   Neurological:  Negative for dizziness, speech difficulty, weakness and numbness.   Psychiatric/Behavioral:  Negative for self-injury and suicidal ideas.        Objective   Visit Vitals  BP (!) 143/92   Pulse 79   Resp 18   Wt (!) 165 kg (363 lb)   SpO2 96%   BMI 60.41 kg/m²   Smoking Status Never   BSA 2.75 m²      Physical Exam  Constitutional:       Appearance: Normal appearance.   HENT:      Head: Normocephalic and atraumatic.       Nose: Nose normal.      Mouth/Throat:      Mouth: Mucous membranes are moist.   Eyes:      Conjunctiva/sclera: Conjunctivae normal.      Pupils: Pupils are equal, round, and reactive to light.   Cardiovascular:      Rate and Rhythm: Normal rate and regular rhythm.      Pulses: Normal pulses.      Heart sounds: Normal heart sounds.   Pulmonary:      Effort: Pulmonary effort is normal.      Breath sounds: Normal breath sounds.   Musculoskeletal:         General: Normal range of motion.      Cervical back: Neck supple.   Skin:     General: Skin is warm.   Neurological:      General: No focal deficit present.      Mental Status: She is alert and oriented to person, place, and time.   Psychiatric:         Mood and Affect: Mood normal.         Behavior: Behavior normal.         Thought Content: Thought content normal.         Judgment: Judgment normal.         Assessment/Plan   Diagnoses and all orders for this visit:  Class 3 severe obesity with serious comorbidity and body mass index (BMI) of 50.0 to 59.9 in adult, unspecified obesity type (Multi)  -     Referral to [facility] Me; Future  Hyperlipidemia, unspecified hyperlipidemia type  -     Referral to [facility]; Future  Primary hypertension  -     Referral to [facility]; Future     Reprots side effects to pheteremine

## 2024-10-01 ENCOUNTER — APPOINTMENT (OUTPATIENT)
Dept: OBSTETRICS AND GYNECOLOGY | Facility: CLINIC | Age: 49
End: 2024-10-01
Payer: COMMERCIAL

## 2024-10-01 VITALS
DIASTOLIC BLOOD PRESSURE: 80 MMHG | HEIGHT: 65 IN | BODY MASS INDEX: 48.82 KG/M2 | SYSTOLIC BLOOD PRESSURE: 120 MMHG | WEIGHT: 293 LBS

## 2024-10-01 DIAGNOSIS — Z12.31 SCREENING MAMMOGRAM FOR BREAST CANCER: ICD-10-CM

## 2024-10-01 DIAGNOSIS — Z01.419 ENCOUNTER FOR ANNUAL ROUTINE GYNECOLOGICAL EXAMINATION: Primary | ICD-10-CM

## 2024-10-01 PROCEDURE — 3008F BODY MASS INDEX DOCD: CPT | Performed by: OBSTETRICS & GYNECOLOGY

## 2024-10-01 PROCEDURE — 99396 PREV VISIT EST AGE 40-64: CPT | Performed by: OBSTETRICS & GYNECOLOGY

## 2024-10-01 ASSESSMENT — PAIN SCALES - GENERAL: PAINLEVEL: 3

## 2024-10-01 NOTE — PATIENT INSTRUCTIONS
Thanks for coming in today for your annual gyn exam.     Arrange to have a mammogram performed once a year.     Follow up with your PCP and other health specialist as needed.     Your next Pap is due next year in 2025.

## 2024-10-07 NOTE — PROGRESS NOTES
47 yo morbidly obese G0 presents for her APE with a Mirena IUD in place and amenorrhea.     Subjective   Patient ID: Augusto Garcia is a 48 y.o. female who presents for Annual Exam (Pap: 9/2023 WNL HPV (NEG)/Mammo: 9/2023 WNL/Cologarud: 5/2023 Negative per patient //Chaperon accepted:  Tory Bolaños CMA ).  HPI    Review of Systems    Objective   Physical Exam  Exam conducted with a chaperone present.   HENT:      Head: Normocephalic.      Right Ear: External ear normal.      Left Ear: External ear normal.      Nose: Nose normal.      Mouth/Throat:      Mouth: Mucous membranes are moist.   Eyes:      Extraocular Movements: Extraocular movements intact.      Pupils: Pupils are equal, round, and reactive to light.   Cardiovascular:      Rate and Rhythm: Normal rate and regular rhythm.      Heart sounds: Normal heart sounds.   Pulmonary:      Effort: Pulmonary effort is normal.      Breath sounds: Normal breath sounds.   Chest:   Breasts:     Left: Normal.   Genitourinary:     General: Normal vulva.      Labia:         Right: No rash.         Left: No rash or lesion.       Vagina: Normal.      Cervix: Normal. No cervical motion tenderness.      Uterus: Normal.       Adnexa: Right adnexa normal and left adnexa normal.      Comments: Exam limited by habitus  Musculoskeletal:         General: Normal range of motion.      Cervical back: Normal range of motion and neck supple.   Skin:     General: Skin is warm and dry.   Neurological:      General: No focal deficit present.      Mental Status: She is alert and oriented to person, place, and time.   Psychiatric:         Mood and Affect: Mood normal.         Behavior: Behavior normal.         Assessment/Plan            Kendra Carmen MD 10/06/24 11:55 PM     A/P: APE     -  Pap due 2025     -  Mammogram     -  PCP F/U     -  RTC 1 year

## 2024-10-09 ASSESSMENT — ENCOUNTER SYMPTOMS
ACTIVITY CHANGE: 0
VOMITING: 0
NAUSEA: 0
HEMATURIA: 0
DIZZINESS: 0
FEVER: 0
NUMBNESS: 0
COUGH: 0
SHORTNESS OF BREATH: 0
ABDOMINAL PAIN: 0
CONSTIPATION: 0
DYSURIA: 0
WEAKNESS: 0
SPEECH DIFFICULTY: 0
WHEEZING: 0

## 2024-10-10 ENCOUNTER — APPOINTMENT (OUTPATIENT)
Dept: PRIMARY CARE | Facility: CLINIC | Age: 49
End: 2024-10-10
Payer: COMMERCIAL

## 2024-10-10 DIAGNOSIS — Z71.3 DIETARY COUNSELING AND SURVEILLANCE: Primary | ICD-10-CM

## 2024-10-10 DIAGNOSIS — E66.01 CLASS 3 SEVERE OBESITY WITH SERIOUS COMORBIDITY AND BODY MASS INDEX (BMI) OF 50.0 TO 59.9 IN ADULT, UNSPECIFIED OBESITY TYPE: ICD-10-CM

## 2024-10-10 DIAGNOSIS — E66.813 CLASS 3 SEVERE OBESITY WITH SERIOUS COMORBIDITY AND BODY MASS INDEX (BMI) OF 50.0 TO 59.9 IN ADULT, UNSPECIFIED OBESITY TYPE: ICD-10-CM

## 2024-10-10 NOTE — PROGRESS NOTES
"Nutrition: Follow-up    Reason for Nutrition Visit: Patient is a 48 y.o. female referred for wt mgmt. Referred on 3/12/24 by Dr. Howard.     Visit 1: 4/23/24      Nutrition Assessment    Food & Nutrition Related History: \"Carlie\" presents in office. Works at [employer] as a manager. She also does [employer]. She reports that she has been watching her diet. She has limited her pop intake. Trying to make small changes. Was started on Phentermine but did not like the way it made her feel (dry mouth, insomnia, constipation).     Dietary Considerations: None  Allergies: None  Intolerance: None  Appetite: Good  Intake: >75%  GI Symptoms : None  Swallowing Difficulty: No problems with swallowing  Dentition : own  Food Preparation: Patient  Cooking Skills/Barriers: None reported  Grocery Shopping: Patient  Supplements: Vitamin D. MVI   Food Insecurity: Denies     Physical Activity: Has started walking     Labs:  CMP trend:    Recent Labs     08/12/24  0940 09/14/23  1427 08/29/23  1558   GLUCOSE 104* 73* 88    141 136   K 3.8 3.5 4.0   CL 98 101 100   CO2 32 30 29   ANIONGAP 10 14 11   BUN 10 14 15   CREATININE 0.79 0.86 0.92   EGFR >90  --   --    CALCIUM 8.9 9.2 8.7   ALBUMIN 3.6 3.9 3.8   ALKPHOS 111*  --  114*   PROT 7.3  --  7.7   AST 12  --  17   BILITOT 0.5  --  0.4   ALT 12  --  17     Lipid Panel trend:    Recent Labs     08/12/24  0940   CHOL 147   HDL 38.4   LDLCALC 92   VLDL 17   TRIG 83     Vit D:   Lab Results   Component Value Date    VITD25 33 08/12/2024     DM specific labs:   Recent Labs     03/12/24  1325 09/14/23  1427 05/09/22  1004   HGBA1C 5.6 5.6 5.6       Nutrition Focused Physical Exam:  Performed/Deferred: Deferred as pt visually appears well-nourished with no signs of malnutrition      Past Medical History:  Patient Active Problem List   Diagnosis    Malignant struma ovarii (Multi)    Class 3 severe obesity with serious comorbidity and body mass index (BMI) of 50.0 to 59.9 in adult (Multi) " "       Anthropometrics:  Ht Readings from Last 1 Encounters:   04/23/24 1.651 m (5' 5\")     BMI Readings from Last 1 Encounters:   04/23/24 58.91 kg/m²     Wt Readings from Last 10 Encounters:   03/12/24 (!) 161 kg (354 lb)   09/12/23 (!) 161 kg (356 lb)     Significant weight change: No    Estimated Nutrition Needs:    Total Energy Estimated Needs (kCal): 1950 kCal   Method for Estimating Needs: MSJ 2237 x 1.2 - 750 (for weight loss)   Total Protein Estimated Needs (g): 95 g   Total Protein Estimated Needs (g/kg): 0.6 g/kg    Nutrition Diagnosis     Patient has Malnutrition Diagnosis: No     Patient has Nutrition Diagnosis: ongoing   Nutrition Diagnosis 1: Excessive energy intake Related to (1): frequent fast food As Evidenced by (1): patient interview     Nutrition Interventions/Recommendations   FOOD & NUTRIENT DELIVERY: Decreased energy diet, Increased fiber diet    NUTRITION COUNSELING: Goal Setting  - Discussed ideas for increasing physical activity into the winter (use [employer] to look for free videos).     *Patient expressed understanding of the education provided and denied any additional questions/concerns.       Nutrition Monitoring and Evaluation     Patient's Goals:   1) Augusto will walk 2 days per week for 20-30 minutes - partially met, ongoing   2) Augusto will use the \"Plate Method\" to plan balanced dinners - partially met, ongoing   3) Augusto will research [employer] to see if this service would be helpful for meal prep - not met, pt still interested in trying this service   4) Augusto will limit her current pop intake to 2 cans per day - met, ongoing     Readiness to Change : Excellent  Level of Understanding : Excellent  Anticipated Compliant : Excellent     Follow-up: 6-8 weeks  "

## 2024-10-17 ENCOUNTER — HOSPITAL ENCOUNTER (OUTPATIENT)
Dept: RADIOLOGY | Facility: CLINIC | Age: 49
Discharge: HOME | End: 2024-10-17
Payer: COMMERCIAL

## 2024-10-17 VITALS — WEIGHT: 293 LBS | BODY MASS INDEX: 48.82 KG/M2 | HEIGHT: 65 IN

## 2024-10-17 DIAGNOSIS — Z12.31 SCREENING MAMMOGRAM FOR BREAST CANCER: ICD-10-CM

## 2024-10-17 DIAGNOSIS — Z01.419 ENCOUNTER FOR ANNUAL ROUTINE GYNECOLOGICAL EXAMINATION: ICD-10-CM

## 2024-10-17 PROCEDURE — 77067 SCR MAMMO BI INCL CAD: CPT | Performed by: RADIOLOGY

## 2024-10-17 PROCEDURE — 77063 BREAST TOMOSYNTHESIS BI: CPT | Performed by: RADIOLOGY

## 2024-10-17 PROCEDURE — 77063 BREAST TOMOSYNTHESIS BI: CPT

## 2024-11-13 DIAGNOSIS — E55.9 VITAMIN D DEFICIENCY: ICD-10-CM

## 2024-11-13 RX ORDER — VIT C/E/ZN/COPPR/LUTEIN/ZEAXAN 250MG-90MG
50 CAPSULE ORAL DAILY
Qty: 180 CAPSULE | Refills: 0 | Status: SHIPPED | OUTPATIENT
Start: 2024-11-13

## 2024-11-14 ENCOUNTER — OFFICE VISIT (OUTPATIENT)
Dept: OBSTETRICS AND GYNECOLOGY | Facility: CLINIC | Age: 49
End: 2024-11-14
Payer: COMMERCIAL

## 2024-11-14 VITALS
WEIGHT: 293 LBS | BODY MASS INDEX: 48.82 KG/M2 | SYSTOLIC BLOOD PRESSURE: 110 MMHG | DIASTOLIC BLOOD PRESSURE: 62 MMHG | HEIGHT: 65 IN

## 2024-11-14 DIAGNOSIS — R92.333 HETEROGENEOUSLY DENSE TISSUE OF BOTH BREASTS ON MAMMOGRAPHY: ICD-10-CM

## 2024-11-14 DIAGNOSIS — N64.4 BREAST PAIN: Primary | ICD-10-CM

## 2024-11-14 PROCEDURE — 99214 OFFICE O/P EST MOD 30 MIN: CPT | Performed by: OBSTETRICS & GYNECOLOGY

## 2024-11-14 PROCEDURE — 3008F BODY MASS INDEX DOCD: CPT | Performed by: OBSTETRICS & GYNECOLOGY

## 2024-11-14 ASSESSMENT — ENCOUNTER SYMPTOMS
SLEEP DISTURBANCE: 1
PSYCHIATRIC NEGATIVE: 0
ALLERGIC/IMMUNOLOGIC NEGATIVE: 0
CONSTITUTIONAL NEGATIVE: 0
RESPIRATORY NEGATIVE: 0
MUSCULOSKELETAL NEGATIVE: 0
NEUROLOGICAL NEGATIVE: 0
CARDIOVASCULAR NEGATIVE: 0
HEMATOLOGIC/LYMPHATIC NEGATIVE: 0
EYES NEGATIVE: 0
ENDOCRINE NEGATIVE: 0
GASTROINTESTINAL NEGATIVE: 0

## 2024-11-14 ASSESSMENT — PAIN SCALES - GENERAL: PAINLEVEL_OUTOF10: 0-NO PAIN

## 2024-11-14 NOTE — PROGRESS NOTES
.50 yo G0 obese woman presents for follow up. She reports breast pain since her mammogram. She denies any masses, nipple retraction or discharge from the breast.     O: WDWN woman in NAD, A&Ox3  Breast - nos skin changes, axillary PARAMJIT, nipple retraction or discharge    A/P: Breast pain    -  Fast MRI d/w the patient

## 2024-11-14 NOTE — PROGRESS NOTES
Assessment and Plan:  Augusto Garcia is a 50 y/o morbidly obese, G0 woman presenting today with right-sided breast pain.     Diagnoses:  #1 Breast pain right after mammogram  #2 Dense breasts noted on mammogram  #3 Family hx of maternal breast cancer    Assessment/Plan:  1. Breast pain right after her mammogram with dense breasts noted on mammography and her mom with a hx of breast cancer diagnosed in her 60s  - Discussed with patient fast MRI, order placed. Patient will consider based on her finances  - Return to the office for annual GYN exam or PRN    Follow up with Dr. Carmen.    Gabiibe Attestation  By signing my name below, I, AmritaBryson Polo, attest that this documentation has been prepared under the direction and in the presence of Kendra Carmen MD on 11/14/2024 at 3:50 PM.     HPI:   Augusto Garcia is a 50 y/o morbidly obese, G0 woman presenting today with right-sided breast pain. Her recent - October 2024 mammogram was within normal limits but revealed heterogeneously dense breast tissue. A fast MRI was recommended at that time. She reports her right-sided breast pain began during her mammogram and has been intermittent since. She describes the pain as throbbing only lasting for a few minutes at a time. She denies discharge from the breast, skin changes, or bruising.    She reports feeling tired today and having a history of insomnia. She reports attempting to take a medication for weight loss but she had to stop as it was affecting her ability to sleep.     ROS:  Review of Systems   Genitourinary:         BREAST: Positive right breast pain. Denies breast discharge, skin changes, or bruising   Psychiatric/Behavioral:  Positive for sleep disturbance.      Physical Exam:   Physical Exam  Constitutional:       General: She is not in acute distress.     Appearance: Normal appearance. She is normal weight.   Genitourinary:   Breasts:     Right: No mass, nipple discharge, skin change or tenderness.       Left: No mass, nipple discharge, skin change or tenderness.      Breast exam comments: They are bilaterally large but appear equal in size.  Lymphadenopathy:      Upper Body:      Right upper body: No axillary adenopathy.      Left upper body: No axillary adenopathy.   Neurological:      Mental Status: She is alert and oriented to person, place, and time.

## 2024-11-21 ENCOUNTER — APPOINTMENT (OUTPATIENT)
Dept: PRIMARY CARE | Facility: CLINIC | Age: 49
End: 2024-11-21
Payer: COMMERCIAL

## 2024-11-29 ENCOUNTER — APPOINTMENT (OUTPATIENT)
Dept: PRIMARY CARE | Facility: CLINIC | Age: 49
End: 2024-11-29
Payer: COMMERCIAL

## 2024-12-13 DIAGNOSIS — E78.5 HYPERLIPIDEMIA, UNSPECIFIED HYPERLIPIDEMIA TYPE: ICD-10-CM

## 2024-12-13 DIAGNOSIS — I10 PRIMARY HYPERTENSION: ICD-10-CM

## 2024-12-15 DIAGNOSIS — I10 PRIMARY HYPERTENSION: ICD-10-CM

## 2024-12-16 DIAGNOSIS — E78.5 HYPERLIPIDEMIA, UNSPECIFIED HYPERLIPIDEMIA TYPE: ICD-10-CM

## 2024-12-16 DIAGNOSIS — I10 PRIMARY HYPERTENSION: ICD-10-CM

## 2024-12-16 RX ORDER — SPIRONOLACTONE 50 MG/1
50 TABLET, FILM COATED ORAL DAILY
Qty: 90 TABLET | Refills: 0 | Status: SHIPPED | OUTPATIENT
Start: 2024-12-16 | End: 2025-03-16

## 2024-12-16 RX ORDER — METOPROLOL TARTRATE 25 MG/1
25 TABLET, FILM COATED ORAL DAILY
Qty: 30 TABLET | Refills: 2 | Status: SHIPPED | OUTPATIENT
Start: 2024-12-16

## 2024-12-16 RX ORDER — ATORVASTATIN CALCIUM 10 MG/1
10 TABLET, FILM COATED ORAL DAILY
Qty: 90 TABLET | Refills: 0 | Status: SHIPPED | OUTPATIENT
Start: 2024-12-16 | End: 2025-03-16

## 2024-12-17 RX ORDER — ATORVASTATIN CALCIUM 10 MG/1
10 TABLET, FILM COATED ORAL DAILY
Qty: 30 TABLET | Refills: 2 | OUTPATIENT
Start: 2024-12-17

## 2024-12-17 RX ORDER — METOPROLOL TARTRATE 25 MG/1
25 TABLET, FILM COATED ORAL 2 TIMES DAILY
Qty: 60 TABLET | Refills: 2 | OUTPATIENT
Start: 2024-12-17

## 2024-12-17 RX ORDER — SPIRONOLACTONE 50 MG/1
50 TABLET, FILM COATED ORAL DAILY
Qty: 90 TABLET | Refills: 0 | OUTPATIENT
Start: 2024-12-17

## 2025-01-28 DIAGNOSIS — E55.9 VITAMIN D DEFICIENCY: ICD-10-CM

## 2025-01-28 RX ORDER — VIT C/E/ZN/COPPR/LUTEIN/ZEAXAN 250MG-90MG
50 CAPSULE ORAL DAILY
Qty: 180 CAPSULE | Refills: 0 | Status: SHIPPED | OUTPATIENT
Start: 2025-01-28

## 2025-02-05 ENCOUNTER — APPOINTMENT (OUTPATIENT)
Dept: OBSTETRICS AND GYNECOLOGY | Facility: CLINIC | Age: 50
End: 2025-02-05
Payer: COMMERCIAL

## 2025-02-27 ENCOUNTER — APPOINTMENT (OUTPATIENT)
Dept: PRIMARY CARE | Facility: CLINIC | Age: 50
End: 2025-02-27
Payer: COMMERCIAL

## 2025-02-28 ENCOUNTER — APPOINTMENT (OUTPATIENT)
Dept: PRIMARY CARE | Facility: CLINIC | Age: 50
End: 2025-02-28
Payer: COMMERCIAL

## 2025-02-28 VITALS
OXYGEN SATURATION: 98 % | HEIGHT: 65 IN | WEIGHT: 293 LBS | DIASTOLIC BLOOD PRESSURE: 70 MMHG | SYSTOLIC BLOOD PRESSURE: 124 MMHG | HEART RATE: 62 BPM | BODY MASS INDEX: 48.82 KG/M2

## 2025-02-28 DIAGNOSIS — E55.9 VITAMIN D DEFICIENCY: Primary | ICD-10-CM

## 2025-02-28 DIAGNOSIS — E66.01 CLASS 3 SEVERE OBESITY WITH SERIOUS COMORBIDITY AND BODY MASS INDEX (BMI) OF 60.0 TO 69.9 IN ADULT, UNSPECIFIED OBESITY TYPE: ICD-10-CM

## 2025-02-28 DIAGNOSIS — E78.5 HYPERLIPIDEMIA, UNSPECIFIED HYPERLIPIDEMIA TYPE: ICD-10-CM

## 2025-02-28 DIAGNOSIS — Z00.00 HEALTH CARE MAINTENANCE: ICD-10-CM

## 2025-02-28 DIAGNOSIS — I10 PRIMARY HYPERTENSION: ICD-10-CM

## 2025-02-28 DIAGNOSIS — E66.813 CLASS 3 SEVERE OBESITY WITH SERIOUS COMORBIDITY AND BODY MASS INDEX (BMI) OF 60.0 TO 69.9 IN ADULT, UNSPECIFIED OBESITY TYPE: ICD-10-CM

## 2025-02-28 PROCEDURE — 3008F BODY MASS INDEX DOCD: CPT | Performed by: STUDENT IN AN ORGANIZED HEALTH CARE EDUCATION/TRAINING PROGRAM

## 2025-02-28 PROCEDURE — 3078F DIAST BP <80 MM HG: CPT | Performed by: STUDENT IN AN ORGANIZED HEALTH CARE EDUCATION/TRAINING PROGRAM

## 2025-02-28 PROCEDURE — 1036F TOBACCO NON-USER: CPT | Performed by: STUDENT IN AN ORGANIZED HEALTH CARE EDUCATION/TRAINING PROGRAM

## 2025-02-28 PROCEDURE — 99214 OFFICE O/P EST MOD 30 MIN: CPT | Performed by: STUDENT IN AN ORGANIZED HEALTH CARE EDUCATION/TRAINING PROGRAM

## 2025-02-28 PROCEDURE — 3074F SYST BP LT 130 MM HG: CPT | Performed by: STUDENT IN AN ORGANIZED HEALTH CARE EDUCATION/TRAINING PROGRAM

## 2025-02-28 RX ORDER — METOPROLOL TARTRATE 25 MG/1
25 TABLET, FILM COATED ORAL 2 TIMES DAILY
Qty: 180 TABLET | Refills: 3 | Status: SHIPPED | OUTPATIENT
Start: 2025-02-28 | End: 2026-02-28

## 2025-02-28 RX ORDER — SPIRONOLACTONE 50 MG/1
50 TABLET, FILM COATED ORAL DAILY
Qty: 90 TABLET | Refills: 2 | Status: SHIPPED | OUTPATIENT
Start: 2025-02-28 | End: 2025-11-25

## 2025-02-28 ASSESSMENT — ENCOUNTER SYMPTOMS
VOMITING: 0
WHEEZING: 0
DIZZINESS: 0
NUMBNESS: 0
DYSURIA: 0
SPEECH DIFFICULTY: 0
ABDOMINAL PAIN: 0
NAUSEA: 0
FEVER: 0
SHORTNESS OF BREATH: 0
CONSTIPATION: 0
COUGH: 0
HEMATURIA: 0
WEAKNESS: 0
ACTIVITY CHANGE: 0

## 2025-02-28 NOTE — PROGRESS NOTES
"Subjective   Patient ID: Augusto Garcia is a 49 y.o. female who presents for Follow-up.  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  Hypertension on metoprolol 25 mg, Aldactone 50 mg  Hyperlipidemia on atorvastatin 10 mg  Hypothyroid and Malogn struma ovari following with endocrinology on levothyroxine 200     Daytime sleepiness:   In-Center Sleep Study-to be done order placed        No past medical history on file.   Past Surgical History:   Procedure Laterality Date    BREAST BIOPSY  2019      Family History   Problem Relation Name Age of Onset    Breast cancer Mother Giovanna Antonio     Arthritis Mother Giovanna Antonio     Diabetes Mother Giovanna Antonio     Hypertension Mother Giovanna Antonio       Allergies   Allergen Reactions    Metronidazole Itching and Swelling    Penicillins Rash          Occupation:     Review of Systems   Constitutional:  Negative for activity change and fever.   HENT:  Negative for congestion.    Respiratory:  Negative for cough, shortness of breath and wheezing.    Cardiovascular:  Negative for chest pain and leg swelling.   Gastrointestinal:  Negative for abdominal pain, constipation, nausea and vomiting.   Endocrine: Negative for cold intolerance.   Genitourinary:  Negative for dysuria, hematuria and urgency.   Neurological:  Negative for dizziness, speech difficulty, weakness and numbness.   Psychiatric/Behavioral:  Negative for self-injury and suicidal ideas.        Objective   Visit Vitals  /70 (BP Location: Right arm, Patient Position: Sitting, BP Cuff Size: Large adult)   Pulse 62   Ht 1.651 m (5' 5\")   Wt (!) 166 kg (366 lb)   SpO2 98%   BMI 60.91 kg/m²   OB Status IUD   Smoking Status Never   BSA 2.76 m²      Physical Exam  Constitutional:       Appearance: Normal " appearance.   HENT:      Head: Normocephalic and atraumatic.      Nose: Nose normal.      Mouth/Throat:      Mouth: Mucous membranes are moist.   Eyes:      Conjunctiva/sclera: Conjunctivae normal.      Pupils: Pupils are equal, round, and reactive to light.   Cardiovascular:      Rate and Rhythm: Normal rate and regular rhythm.      Pulses: Normal pulses.      Heart sounds: Normal heart sounds.   Pulmonary:      Effort: Pulmonary effort is normal.      Breath sounds: Normal breath sounds.   Musculoskeletal:         General: Normal range of motion.      Cervical back: Neck supple.   Skin:     General: Skin is warm.   Neurological:      General: No focal deficit present.      Mental Status: She is alert and oriented to person, place, and time.   Psychiatric:         Mood and Affect: Mood normal.         Behavior: Behavior normal.         Thought Content: Thought content normal.         Judgment: Judgment normal.         Assessment/Plan   Diagnoses and all orders for this visit:  Vitamin D deficiency  Primary hypertension  -     metoprolol tartrate (Lopressor) 25 mg tablet; Take 1 tablet (25 mg) by mouth 2 times a day.  -     spironolactone (Aldactone) 50 mg tablet; Take 1 tablet (50 mg) by mouth once daily.  -     CBC  -     Comprehensive Metabolic Panel; Future  Class 3 severe obesity with serious comorbidity and body mass index (BMI) of 60.0 to 69.9 in adult, unspecified obesity type  Hyperlipidemia, unspecified hyperlipidemia type  -     Lipid Panel; Future  -     TSH with reflex to Free T4 if abnormal; Future  Health care maintenance  -     Colonoscopy Screening; Average Risk Patient; Future

## 2025-03-06 ENCOUNTER — APPOINTMENT (OUTPATIENT)
Dept: PRIMARY CARE | Facility: CLINIC | Age: 50
End: 2025-03-06
Payer: COMMERCIAL

## 2025-03-28 DIAGNOSIS — E78.5 HYPERLIPIDEMIA, UNSPECIFIED HYPERLIPIDEMIA TYPE: ICD-10-CM

## 2025-03-28 DIAGNOSIS — I10 PRIMARY HYPERTENSION: ICD-10-CM

## 2025-03-28 RX ORDER — ATORVASTATIN CALCIUM 10 MG/1
10 TABLET, FILM COATED ORAL DAILY
Qty: 30 TABLET | Refills: 2 | Status: SHIPPED | OUTPATIENT
Start: 2025-03-28

## 2025-03-28 RX ORDER — METOPROLOL TARTRATE 25 MG/1
25 TABLET, FILM COATED ORAL 2 TIMES DAILY
Qty: 180 TABLET | Refills: 2 | Status: SHIPPED | OUTPATIENT
Start: 2025-03-28

## 2025-03-29 ENCOUNTER — OFFICE VISIT (OUTPATIENT)
Dept: URGENT CARE | Age: 50
End: 2025-03-29
Payer: COMMERCIAL

## 2025-03-29 VITALS
TEMPERATURE: 98 F | DIASTOLIC BLOOD PRESSURE: 98 MMHG | HEIGHT: 65 IN | OXYGEN SATURATION: 99 % | RESPIRATION RATE: 18 BRPM | SYSTOLIC BLOOD PRESSURE: 158 MMHG | BODY MASS INDEX: 48.82 KG/M2 | HEART RATE: 60 BPM | WEIGHT: 293 LBS

## 2025-03-29 DIAGNOSIS — R30.0 DYSURIA: ICD-10-CM

## 2025-03-29 DIAGNOSIS — R39.9 UTI SYMPTOMS: Primary | ICD-10-CM

## 2025-03-29 LAB
POC APPEARANCE, URINE: CLEAR
POC BILIRUBIN, URINE: NEGATIVE
POC BLOOD, URINE: NEGATIVE
POC COLOR, URINE: YELLOW
POC GLUCOSE, URINE: NEGATIVE MG/DL
POC KETONES, URINE: NEGATIVE MG/DL
POC LEUKOCYTES, URINE: NEGATIVE
POC NITRITE,URINE: NEGATIVE
POC PH, URINE: 6 PH
POC PROTEIN, URINE: NEGATIVE MG/DL
POC SPECIFIC GRAVITY, URINE: 1.01
POC UROBILINOGEN, URINE: 0.2 EU/DL
PREGNANCY TEST URINE, POC: NEGATIVE

## 2025-03-29 NOTE — PROGRESS NOTES
"Subjective   Patient ID: Augusto Garcia is a 49 y.o. female. They present today with a chief complaint of Urinary Problem.    History of Present Illness  Patient is a pleasant 49-year-old -American female, no significant past medical history, presenting to the clinic for chief complaint of burning with urination.  Patient reporting approximately 2 to 3-day history of burning at the end of her urinary stream with persistent burning while she finishes urinating.  She is reporting a small amount of increased frequency as well.  She does report being sexually active with last intercourse being on Sunday, 1 week ago.  She denies any vaginal discharge or bleeding.  She is unsure if this is BV STD or UTI.  She denies any abdominal pain, nausea, vomiting.  Denies any genital lesions rashes or ulcerations.  No fever or chills.  No further complaints.          Past Medical History  Allergies as of 03/29/2025 - Reviewed 03/29/2025   Allergen Reaction Noted    Metronidazole Itching and Swelling 01/18/2007    Penicillins Rash 01/18/2007       (Not in a hospital admission)         No past medical history on file.    Past Surgical History:   Procedure Laterality Date    BREAST BIOPSY  2019        reports that she has never smoked. She has never been exposed to tobacco smoke. She has never used smokeless tobacco. She reports current alcohol use of about 1.0 standard drink of alcohol per week. She reports that she does not use drugs.    Review of Systems  Review of Systems                               Objective    Vitals:    03/29/25 1504   BP: (!) 158/98   Pulse: 60   Resp: 18   Temp: 36.7 °C (98 °F)   TempSrc: Oral   SpO2: 99%   Weight: (!) 159 kg (350 lb)   Height: 1.651 m (5' 5\")     No LMP recorded. (Menstrual status: IUD).    Physical Exam  General: Vitals Noted. No distress. Normocephalic.     HEENT: TMs normal, EOMI, normal conjunctiva, patent nares, Normal OP    Neck: Supple with no adenopathy.     Cardiac: Regular " Rate and Rhythm. No murmur.     Pulmonary: Equal breath sounds bilaterally. No wheezes, rhonchi, or rales.    Abdomen: Soft, non-tender, with normal bowel sounds.     Musculoskeletal: Moves all extremities, no effusion, no edema.     Skin: No obvious rashes.    Procedures    Point of Care Test & Imaging Results from this visit    No results found.    Diagnostic study results (if any) were reviewed by Juliocesar Cabrera PA-C.    Assessment/Plan   Allergies, medications, history, and pertinent labs/EKGs/Imaging reviewed by Juliocesar Cabrera PA-C.     Medical Decision Making  Patient was seen eval in the clinic for to complaint of burning with urination.  On exam patient is nontoxic well-appearing respect comfortably no acute distress.  Vital signs are stable, afebrile.  Chest is clear, heart is regular, belly is diffusely soft and nontender.  Urine was obtained the clinic and urinalysis unremarkable.  Urine culture will be sent.  You have also been sent for STD testing.  Swab was obtained for BV and yeast testing.  Patient informed we will see results in the next 2 to 3 days.  Advised to abstain from sexual intercourse in the meantime.  Advised Tucks wipes as needed.  We discharged home at this time.  Reviewed my impression, plan, strict return versus report to ED precautions with the patient.  She expresses understanding and agreement plan of care.      Orders and Diagnoses  Diagnoses and all orders for this visit:  UTI symptoms  -     POCT UA Automated manually resulted  -     Urine Culture  -     C. trachomatis / N. gonorrhoeae, Amplified, Urogenital  -     Trichomonas vaginalis, Amplified  -     Vaginitis Gram Stain For Bacterial Vaginosis + Yeast        Medical Admin Record      Follow Up Instructions  No follow-ups on file.    Patient disposition: Home    Electronically signed by Juliocesar Cabrera PA-C  3:57 PM

## 2025-03-30 LAB
BV SCORE VAG QL: NORMAL
C TRACH RRNA SPEC QL NAA+PROBE: NORMAL
N GONORRHOEA RRNA SPEC QL NAA+PROBE: NORMAL
T VAGINALIS RRNA SPEC QL NAA+PROBE: NORMAL

## 2025-03-31 LAB
BACTERIA UR CULT: NORMAL
BV SCORE VAG QL: NORMAL
C TRACH RRNA SPEC QL NAA+PROBE: NOT DETECTED
N GONORRHOEA RRNA SPEC QL NAA+PROBE: NOT DETECTED
QUEST GC CT AMPLIFIED (ALWAYS MESSAGE): NORMAL
T VAGINALIS RRNA SPEC QL NAA+PROBE: NOT DETECTED

## 2025-04-25 ENCOUNTER — OFFICE VISIT (OUTPATIENT)
Facility: HOSPITAL | Age: 50
End: 2025-04-25
Payer: COMMERCIAL

## 2025-04-25 ENCOUNTER — OFFICE VISIT (OUTPATIENT)
Dept: PRIMARY CARE | Facility: HOSPITAL | Age: 50
End: 2025-04-25
Payer: COMMERCIAL

## 2025-04-25 VITALS
WEIGHT: 293 LBS | HEIGHT: 65 IN | TEMPERATURE: 96.6 F | OXYGEN SATURATION: 100 % | BODY MASS INDEX: 48.82 KG/M2 | DIASTOLIC BLOOD PRESSURE: 62 MMHG | SYSTOLIC BLOOD PRESSURE: 139 MMHG | HEART RATE: 82 BPM

## 2025-04-25 DIAGNOSIS — E66.813 CLASS 3 SEVERE OBESITY WITH SERIOUS COMORBIDITY AND BODY MASS INDEX (BMI) OF 50.0 TO 59.9 IN ADULT, UNSPECIFIED OBESITY TYPE: Primary | ICD-10-CM

## 2025-04-25 DIAGNOSIS — Z98.890 HISTORY OF THYROIDECTOMY: ICD-10-CM

## 2025-04-25 DIAGNOSIS — E66.813 CLASS 3 SEVERE OBESITY DUE TO EXCESS CALORIES WITHOUT SERIOUS COMORBIDITY WITH BODY MASS INDEX (BMI) OF 60.0 TO 69.9 IN ADULT: ICD-10-CM

## 2025-04-25 DIAGNOSIS — E66.01 CLASS 3 SEVERE OBESITY DUE TO EXCESS CALORIES WITHOUT SERIOUS COMORBIDITY WITH BODY MASS INDEX (BMI) OF 60.0 TO 69.9 IN ADULT: ICD-10-CM

## 2025-04-25 DIAGNOSIS — I10 PRIMARY HYPERTENSION: ICD-10-CM

## 2025-04-25 DIAGNOSIS — Z90.89 HISTORY OF THYROIDECTOMY: ICD-10-CM

## 2025-04-25 DIAGNOSIS — E78.5 HYPERLIPIDEMIA, UNSPECIFIED HYPERLIPIDEMIA TYPE: ICD-10-CM

## 2025-04-25 DIAGNOSIS — E66.01 PSYCHOLOGICAL FACTORS AFFECTING MORBID OBESITY (MULTI): ICD-10-CM

## 2025-04-25 DIAGNOSIS — E66.01 CLASS 3 SEVERE OBESITY WITH SERIOUS COMORBIDITY AND BODY MASS INDEX (BMI) OF 50.0 TO 59.9 IN ADULT, UNSPECIFIED OBESITY TYPE: Primary | ICD-10-CM

## 2025-04-25 DIAGNOSIS — F54 PSYCHOLOGICAL FACTORS AFFECTING MORBID OBESITY (MULTI): ICD-10-CM

## 2025-04-25 PROBLEM — E66.09 OBESITY DUE TO EXCESS CALORIES: Status: ACTIVE | Noted: 2025-04-25

## 2025-04-25 PROCEDURE — 90791 PSYCH DIAGNOSTIC EVALUATION: CPT | Performed by: PSYCHOLOGIST

## 2025-04-25 PROCEDURE — 1036F TOBACCO NON-USER: CPT | Performed by: PSYCHOLOGIST

## 2025-04-25 ASSESSMENT — PAIN SCALES - GENERAL: PAINLEVEL_OUTOF10: 0-NO PAIN

## 2025-04-25 ASSESSMENT — ENCOUNTER SYMPTOMS
CHILLS: 0
CHEST TIGHTNESS: 0
DYSURIA: 0
BACK PAIN: 0
ABDOMINAL PAIN: 0
SHORTNESS OF BREATH: 0
PALPITATIONS: 0

## 2025-04-25 NOTE — PROGRESS NOTES
"Time started: 930  Time ended: 10:00   Total time spent: 30 minutes  Visit type: in person       Disclaimer: We discussed that the note will be visible and others healthcare practitioners will have access. The patient \consented to an unrestricted note.     [facility]: Health Psychology, New Patient Visit  Referral: Dr. Chace Burton  Chief Complaint: Psychiatric Evaluation ([facility] )       Brief Background:   Augusto Garcia is a  49 y.o. year-old,  for over 10 years,  Black or  female. The patient was born and raised in [place], [place] raised by her mom and her grandmother.The patient does not have children. The patient has 2 siblings. She lives in an apartment alone and feels safe.   Employment: [employer] as a manager ()   Education: 12th grade       Weight history:  The patient started having problems with excess weight when She was 18 years old.     Highest adult weight (non-pregnant): 378 lbs  Lowest adult weight: 175 when she was 18-19  Current weight: 378 lbs   Height: 5'5\"  Diets tried: weight watchers   Patient had the most success with WW, losing 25  pounds.   Factors that led to weight gain or weight regain include: sedentary lifestyle    Motivation for starting this program and overall goals    \"I want to be around. I want to be healthy.\" She does not like being winded easily.   She would love to be 300 lbs (lose 78 lbs). She wants to be off medications for hypertension.     Behavioral and/or eating disorders contributing to excess weight:   The patient eats more than intended or planned in response to: Boredom and Stress  Food weaknesses include: chips and pop and candy    Disordered eating History:   The patient denied a history of an eating disorder.       Current eating disorder assessment:    Binge Eating Disorder symptoms: Does not meet criteria  Night Eating Syndrome: DCnighteatingsyndrome: The patient does not meet criteria for night eating syndrome    Graze " Eating Habits: The patient reported She snacks 2x per day.    The patient does not meet criteria for an eating disorder.       Mental health history:     Currently, the patient is not being treated for a mental health disorder.     Hospitalizations for mental health: Denied  Suicide attempts: Denied  Self-injurious behaviors: Denied    Sleep: it is disrupted from night sweats. She is also having hot flashes. She has PCOS.     Substance, tobacco, and alcohol use history:    History of alcohol dependence: Denied  Substance dependence: Denied    Dependence on prescription medications: Denied  Tobacco dependence: Denied  Treatment programs for addiction: Denied    Current Cannabis use:  Denied     Please see AUDIT for alcohol use habits over the past year.   Current Tobacco use habits include:    Tobacco Use: Low Risk  (4/25/2025)    Patient History     Smoking Tobacco Use: Never     Smokeless Tobacco Use: Never     Passive Exposure: Never        FITTER ME GOALS:     The most  the beverage goal due to having to give up pop. The evening goal  The least challenging: BF and exercise    Barriers to changing health behaviors:     Initial goals to get started:  BF and exercise.     Mental Status Evaluation  General Appearance: well groomed, appropriate eye contact  Attitude/Behavior: cooperative  Motor: no psychomotor agitation or retardation, no tremor or other abnormal movements  Speech:  rapid but not pressured. She just appeared excited today  Gait/Station: [facility]  Mood:  excited about the program    Affect: euthymic, full-range  Thought Process: overinclusive with details but able to be redirected  Thought Associations: no loosening of associations  Thought Content: normal  Perception: no perceptual abnormalities noted  Sensorium: alert and oriented to person, place, time and situation  Insight: fair  Judgment: fair  Cognition: cognitively intact to conversational testing with respect to attention, orientation, fund of  knowledge, recent and remote memory, and language       Summary:   Augusto Garcia   is a  49 y.o. year-old  , , Black or  female who presents today with a history of obesity with comorbid medical conditions and difficulty with weight loss. The purpose of this evaluation was to conduct a health behavior evaluation to determine the patient's overall health goals.     Follow ups: Augusto is scheduled in the Fitter Me education groups.     Tina White, PhD

## 2025-04-25 NOTE — PROGRESS NOTES
Ms. Garcia is a 49-year-old female with a medical history that includes Class Three Obesity and Malignant Struma Ovarii. She is presenting today for a new patient visit at the [facility] clinic.    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 For breakfast, they typically eat oatmeal and an apple, sometimes opting for yogurt, fruit, or oatmeal from the store. Lunch usually consists of a burger, occasionally paired with a small order of fries. For dinner, especially on days they return from their 4 pm shift, they have a variety of meals, including salad, pizza, or leftover baked chicken.     In terms of beverages, the individual drinks soda daily and consumes energy drinks, though the exact amount is not specified. They drink approximately six glasses of water each day, and they have visited dietitians in the past for guidance. The primary care provider has made efforts to help manage their health. In 2020, they tried Weight Watchers and successfully lost weight, but unfortunately, they regained it later. They also attempted Phentermine as a weight loss aid, though this was less than five years ago, unsure of time, reported stopping it because of side effects. There are barriers to managing their health, though details on those specific barriers were not provided.    PMHx:     Medical History[1]    PSHX:     Surgical History[2]     SocHx:  Any changes in living with, Substance use/abuse, Alcohol use?  Meds:   - Medication list UTD    Social History[3]      Allergies:    RX Allergies[4]      FMHx:   Family History[5]          Review of Systems:   Review of Systems   Constitutional:  Negative for chills.   Respiratory:  Negative for chest tightness and shortness of breath.    Cardiovascular:  Negative for chest pain and palpitations.   Gastrointestinal:  Negative for abdominal pain.    Endocrine: Negative for cold intolerance and heat intolerance.   Genitourinary:  Negative for dysuria.   Musculoskeletal:  Negative for back pain.   Psychiatric/Behavioral:  Negative for suicidal ideas.      366 lbs    Measurements      Vitals:    04/25/25 0801   BP: 139/62   Pulse: 82   Temp: 35.9 °C (96.6 °F)   SpO2: 100%       Physical Exam  HENT:      Head: Normocephalic.      Nose: Nose normal. No congestion.      Mouth/Throat:      Mouth: Mucous membranes are moist.   Eyes:      Pupils: Pupils are equal, round, and reactive to light.   Cardiovascular:      Rate and Rhythm: Normal rate and regular rhythm.   Pulmonary:      Effort: Pulmonary effort is normal.      Breath sounds: Normal breath sounds.   Abdominal:      General: Bowel sounds are normal.      Palpations: Abdomen is soft.   Musculoskeletal:         General: Normal range of motion.      Cervical back: Normal range of motion.   Skin:     General: Skin is warm.      Capillary Refill: Capillary refill takes less than 2 seconds.      Coloration: Skin is not jaundiced.   Neurological:      General: No focal deficit present.      Mental Status: She is alert.   Psychiatric:         Mood and Affect: Mood normal.         Behavior: Behavior normal.       Labs:   HDL: 38.4 (as of 4/25/2025)    TSH: 0.37 (7 months ago)    Vitamin D: 37 (as of 3/12/2024)      Caloric Guidance  Educated the patient on caloric deficits and surplus and the effect calories have on weight  [facility]: Reviewed w/ patient that an comprehensive weight loss plan includes tracking your weight and daily food intake. Ensured patient was able to download the diane, and reviewed w/ patient how to enter weight and food intake into the diane effectively.   Caloric Goals: Using the [employer] Weight Planner Site, was able to calculate the patients caloric needs to remain the same weight, as well as the goal caloric intake to achieve weight loss at a goal of 1 lbs per week.   ----    Dx. Obesity  Class ----  c/b ------  1. Class 3 severe obesity with serious comorbidity and body mass index (BMI) of 50.0 to 59.9 in adult, unspecified obesity type  Referral to [facility]    Referral to Bariatric Surgery      2. Hyperlipidemia, unspecified hyperlipidemia type  Referral to [facility]      3. Primary hypertension  [facility]      4. History of thyroidectomy            Current Weight Medications:  Adjustment to Pharmaceutical Intervention:    4 Pillars  - Reviewed w/ patient the 4 pillars of [facility], the highlighted pillars are our focus until our next visit:    1. Within 30 minutes of waking up have a high protein breakfast    2. Replace all pop's and sugary drinks with water   3. Daily Exercise (Youtube'd a video on Obesity workout and showed patient what the workout entails).    4. Do not eat after 7pm, it is ok to go to bed hungry     Referrals  ->Referral to Bariatric Surgery    Goals  1. The four pillars of treatment were emphasized.       **Goal:** The reasoning was explained, and the patient is receptive to the plan.      2. A referral for bariatric surgery has been provided.       **Goal:** To support long-term health improvement.      3. The patient will follow up with our service in 2-3 months.       **Goal:** To assess medication progress and determine if goals are being met.      4. Nutrition education and counseling were provided.      5. Patient was also given information about follow up with a Virtual Group Session weekly    6. Labs ordered by Primary Care, will be collected today        RTC in 3 months for FU or as needed.     Discussed w/ Dr. Micheal Donaldson DO, MPH, PGYIII  PM Resident          [1] No past medical history on file.  [2]   Past Surgical History:  Procedure Laterality Date    BREAST BIOPSY  2019   [3]   Social History  Socioeconomic History    Marital status: Single   Tobacco Use    Smoking status: Never     Passive exposure: Never    Smokeless tobacco: Never   Vaping  Use    Vaping status: Never Used   Substance and Sexual Activity    Alcohol use: Yes     Alcohol/week: 1.0 standard drink of alcohol     Types: 1 Glasses of wine per week     Comment: Social outings    Drug use: Never    Sexual activity: Yes     Partners: Male     Birth control/protection: I.U.D.     Social Drivers of Health     Financial Resource Strain: Low Risk  (5/9/2022)    Received from [facility]    Overall Financial Resource Strain (CARDIA)     Difficulty of Paying Living Expenses: Not very hard   Food Insecurity: No Food Insecurity (5/9/2022)    Received from [facility]    Hunger Vital Sign     Worried About Running Out of Food in the Last Year: Never true     Ran Out of Food in the Last Year: Never true   Transportation Needs: No Transportation Needs (5/9/2022)    Received from [facility]    PRAPARE - Transportation     Lack of Transportation (Medical): No     Lack of Transportation (Non-Medical): No   [4]   Allergies  Allergen Reactions    Metronidazole Itching and Swelling    Penicillins Rash   [5]   Family History  Problem Relation Name Age of Onset    Breast cancer Mother Giovanna Antonio     Arthritis Mother Giovanna Antonio     Diabetes Mother Giovanna Antonio     Hypertension Mother Giovanna Antonio

## 2025-04-26 LAB
ALBUMIN SERPL-MCNC: 3.7 G/DL (ref 3.6–5.1)
ALP SERPL-CCNC: 114 U/L (ref 31–125)
ALT SERPL-CCNC: 12 U/L (ref 6–29)
ANION GAP SERPL CALCULATED.4IONS-SCNC: 9 MMOL/L (CALC) (ref 7–17)
AST SERPL-CCNC: 15 U/L (ref 10–35)
BILIRUB SERPL-MCNC: 0.5 MG/DL (ref 0.2–1.2)
BUN SERPL-MCNC: 11 MG/DL (ref 7–25)
CALCIUM SERPL-MCNC: 8.4 MG/DL (ref 8.6–10.2)
CHLORIDE SERPL-SCNC: 100 MMOL/L (ref 98–110)
CHOLEST SERPL-MCNC: 141 MG/DL
CHOLEST/HDLC SERPL: 3.4 (CALC)
CO2 SERPL-SCNC: 28 MMOL/L (ref 20–32)
CREAT SERPL-MCNC: 0.93 MG/DL (ref 0.5–0.99)
EGFRCR SERPLBLD CKD-EPI 2021: 75 ML/MIN/1.73M2
ERYTHROCYTE [DISTWIDTH] IN BLOOD BY AUTOMATED COUNT: 13.6 % (ref 11–15)
GLUCOSE SERPL-MCNC: 108 MG/DL (ref 65–99)
HCT VFR BLD AUTO: 41.2 % (ref 35–45)
HDLC SERPL-MCNC: 42 MG/DL
HGB BLD-MCNC: 13 G/DL (ref 11.7–15.5)
LDLC SERPL CALC-MCNC: 82 MG/DL (CALC)
MCH RBC QN AUTO: 29.1 PG (ref 27–33)
MCHC RBC AUTO-ENTMCNC: 31.6 G/DL (ref 32–36)
MCV RBC AUTO: 92.4 FL (ref 80–100)
NONHDLC SERPL-MCNC: 99 MG/DL (CALC)
PLATELET # BLD AUTO: 236 THOUSAND/UL (ref 140–400)
PMV BLD REES-ECKER: 11.6 FL (ref 7.5–12.5)
POTASSIUM SERPL-SCNC: 3.8 MMOL/L (ref 3.5–5.3)
PROT SERPL-MCNC: 7.4 G/DL (ref 6.1–8.1)
RBC # BLD AUTO: 4.46 MILLION/UL (ref 3.8–5.1)
SODIUM SERPL-SCNC: 137 MMOL/L (ref 135–146)
TRIGL SERPL-MCNC: 83 MG/DL
TSH SERPL-ACNC: 1.65 MIU/L
WBC # BLD AUTO: 7.5 THOUSAND/UL (ref 3.8–10.8)

## 2025-05-01 ENCOUNTER — CLINICAL SUPPORT (OUTPATIENT)
Facility: HOSPITAL | Age: 50
End: 2025-05-01
Payer: COMMERCIAL

## 2025-05-01 DIAGNOSIS — F54 PSYCHOLOGICAL FACTORS AFFECTING MORBID OBESITY (MULTI): ICD-10-CM

## 2025-05-01 DIAGNOSIS — E66.01 PSYCHOLOGICAL FACTORS AFFECTING MORBID OBESITY (MULTI): ICD-10-CM

## 2025-05-01 DIAGNOSIS — E66.813 CLASS 3 SEVERE OBESITY DUE TO EXCESS CALORIES WITHOUT SERIOUS COMORBIDITY WITH BODY MASS INDEX (BMI) OF 60.0 TO 69.9 IN ADULT: ICD-10-CM

## 2025-05-01 DIAGNOSIS — E66.813 CLASS 3 SEVERE OBESITY WITH SERIOUS COMORBIDITY AND BODY MASS INDEX (BMI) OF 50.0 TO 59.9 IN ADULT: ICD-10-CM

## 2025-05-01 PROCEDURE — 96164 HLTH BHV IVNTJ GRP 1ST 30: CPT | Performed by: PSYCHOLOGIST

## 2025-05-01 PROCEDURE — 96165 HLTH BHV IVNTJ GRP EA ADDL: CPT | Performed by: PSYCHOLOGIST

## 2025-05-03 NOTE — GROUP NOTE
Virtual or Telephone Consent    An interactive audio and video telecommunication system which permits real time communications between the patient (at the originating site) and provider (at the distant site) was utilized to provide this telehealth service.   Verbal consent was requested and obtained from Augusto Garcia on this date, 05/03/25 for a telehealth visit and the patient's location was confirmed at the time of the visit.;    Group Topic: Coping Skills   Group Date: 5/1/2025  Start Time:  4:00 PM  End Time:  5:00 PM  Facilitators: Tina White, PhD; Regina Donaldson DO   [facility]    Number of Participants: 5   Group Focus: other [facility] Education  Treatment Modality: Other: behavioral medicine  Interventions utilized were: education about the morning goal and assistance with incorporating new habits.   To NOTE: patient's medical conditions were in taken into account.   Purpose: other reduction of medical risks and managing health behaviors      We discussed that the note will be visible and others healthcare practitioners will have access. The patient has consented to an unrestricted note due to working with a multidisciplinary team.  Virtual agreement was sent to all patients. This agreement included that the patient will notify the group facilitators if having thoughts of harming oneself or having suicidal thought or a plan to self-harm.   The patient did not report suicidal ideation or plans.   Name: Augusto Garcia YOB: 1975   MR: [id number]      Facilitator: Psychologist and Family Medicine Resident  Level of Participation: active  Quality of Participation: appropriate/pleasant  Interactions with others: appropriate, gave feedback, supportive, and asked thoughtful questions  Mood/Affect: euthymic, full range  Cognition: insightful and logical  Progress: Minimal and gaining insight  Plan: continue with services

## 2025-05-08 ENCOUNTER — CLINICAL SUPPORT (OUTPATIENT)
Facility: HOSPITAL | Age: 50
End: 2025-05-08
Payer: COMMERCIAL

## 2025-05-08 DIAGNOSIS — F54 PSYCHOLOGICAL FACTORS AFFECTING MORBID OBESITY (MULTI): ICD-10-CM

## 2025-05-08 DIAGNOSIS — E66.813 CLASS 3 SEVERE OBESITY DUE TO EXCESS CALORIES WITH BODY MASS INDEX (BMI) OF 60.0 TO 69.9 IN ADULT, UNSPECIFIED WHETHER SERIOUS COMORBIDITY PRESENT: ICD-10-CM

## 2025-05-08 DIAGNOSIS — E66.01 PSYCHOLOGICAL FACTORS AFFECTING MORBID OBESITY (MULTI): ICD-10-CM

## 2025-05-08 DIAGNOSIS — C56.9: ICD-10-CM

## 2025-05-08 DIAGNOSIS — E66.813 CLASS 3 SEVERE OBESITY WITH SERIOUS COMORBIDITY AND BODY MASS INDEX (BMI) OF 50.0 TO 59.9 IN ADULT: ICD-10-CM

## 2025-05-08 PROCEDURE — 96165 HLTH BHV IVNTJ GRP EA ADDL: CPT | Performed by: PSYCHOLOGIST

## 2025-05-08 PROCEDURE — 96164 HLTH BHV IVNTJ GRP 1ST 30: CPT | Performed by: PSYCHOLOGIST

## 2025-05-09 NOTE — GROUP NOTE
Virtual or Telephone Consent    An interactive audio and video telecommunication system which permits real time communications between the patient (at the originating site) and provider (at the distant site) was utilized to provide this telehealth service.   Verbal consent was requested and obtained from Augusto Garcia on this date, 05/09/25 for a telehealth visit and the patient's location was confirmed at the time of the visit.     Group Topic: Coping Skills   Group Date: 5/8/2025  Start Time:  4:00 PM  End Time:  5:00 PM  Facilitators: Tina White, PhD; Regina Donaldson DO   [facility]    Number of Participants: 6   4-5pm  Group Focus: other [facility] Education  Treatment Modality: Other: behavioral medicine  Interventions utilized were education and problem solving  To NOTE: patient's medical conditions were in taken into account.   Purpose: other reduction of medical risks and managing health behaviors      We discussed that the note will be visible and others healthcare practitioners will have access. The patient has consented to an unrestricted note due to working with a multidisciplinary team.  Virtual agreement was sent to all patients. This agreement included that the patient would notify the group facilitators if having thoughts of harming oneself or having suicidal thought or a plan to self-harm.   The patient did not report suicidal ideation or plans.      Education was provided about the beverage goal.  Group members interacted with one another.  Questions were answered. Education, positive reinforcement, and problem solving were used as appropriate.  Name: Augusto Garcia YOB: 1975   MR: [id number]      Facilitator: Psychologist and Family Medicine Resident  Level of Participation: active  Quality of Participation: appropriate/pleasant  Interactions with others: gave feedback and supportive  Mood/Affect: euthymic, full range  Cognition: insightful and  logical  Progress: Gaining insight or knowledge and minimal progress  Plan: continue with services

## 2025-05-22 ENCOUNTER — APPOINTMENT (OUTPATIENT)
Dept: BEHAVIORAL HEALTH | Facility: CLINIC | Age: 50
End: 2025-05-22
Payer: COMMERCIAL

## 2025-05-22 DIAGNOSIS — C56.9: ICD-10-CM

## 2025-05-22 DIAGNOSIS — F54 PSYCHOLOGICAL FACTORS AFFECTING MORBID OBESITY (MULTI): ICD-10-CM

## 2025-05-22 DIAGNOSIS — E66.813 CLASS 3 SEVERE OBESITY DUE TO EXCESS CALORIES WITH SERIOUS COMORBIDITY AND BODY MASS INDEX (BMI) OF 60.0 TO 69.9 IN ADULT: ICD-10-CM

## 2025-05-22 DIAGNOSIS — E66.01 PSYCHOLOGICAL FACTORS AFFECTING MORBID OBESITY (MULTI): ICD-10-CM

## 2025-05-22 PROCEDURE — 96165 HLTH BHV IVNTJ GRP EA ADDL: CPT | Performed by: PSYCHOLOGIST

## 2025-05-22 PROCEDURE — 96164 HLTH BHV IVNTJ GRP 1ST 30: CPT | Performed by: PSYCHOLOGIST

## 2025-05-27 NOTE — GROUP NOTE
We discussed that the note will be visible and others healthcare practitioners will have access. The patient has consented to an unrestricted note, due to working with a multidisciplinary team.    Virtual or Telephone Consent    An interactive audio and video telecommunication system which permits real time communications between the patient (at the originating site) and provider (at the distant site) was utilized to provide this telehealth service.   Verbal consent was requested and obtained from Augusto Garcia on this date, 05/27/25 for a telehealth visit and the patient's location was confirmed at the time of the visit.     Group Topic: Coping Skills   Group Date: 5/22/2025  Start Time:  4:00 PM  End Time:  5:00 PM  Facilitators: Tina White, PhD   Department:  [facility]    Number of Participants: 4   Group Focus: [facility] goals, evening and movement  Treatment Modality: Health Behavior change  Interventions utilized were other focus on health behavior change to manage overall health  Purpose: other: manage overall health and reduce BMI    No SI or plan reported.   Group confidentiality reviewed.   Staff to send patients virtual agreement, if have not sent previously.   Name: Augusto Garcia YOB: 1975   MR: [id number]      Facilitator: Psychologist  Level of Participation: active  Quality of Participation: appropriate/pleasant, redirectable  Interactions with others: appropriate, gave feedback, and supportive  Mood/Affect: euthymic   Cognition: insightful and logical  Progress: Minimal, increased her exercise from 0 to 3x/week. Goal is to increase cardio and exercise 5x/week.   Plan: continue with services

## 2025-06-26 ENCOUNTER — CLINICAL SUPPORT (OUTPATIENT)
Dept: BEHAVIORAL HEALTH | Facility: CLINIC | Age: 50
End: 2025-06-26
Payer: COMMERCIAL

## 2025-06-26 DIAGNOSIS — F54 PSYCHOLOGICAL FACTORS AFFECTING MORBID OBESITY (MULTI): ICD-10-CM

## 2025-06-26 DIAGNOSIS — E66.813 CLASS 3 SEVERE OBESITY DUE TO EXCESS CALORIES WITHOUT SERIOUS COMORBIDITY WITH BODY MASS INDEX (BMI) OF 60.0 TO 69.9 IN ADULT: ICD-10-CM

## 2025-06-26 DIAGNOSIS — E66.01 PSYCHOLOGICAL FACTORS AFFECTING MORBID OBESITY (MULTI): ICD-10-CM

## 2025-06-26 PROCEDURE — 96164 HLTH BHV IVNTJ GRP 1ST 30: CPT | Performed by: PSYCHOLOGIST

## 2025-06-26 PROCEDURE — 96165 HLTH BHV IVNTJ GRP EA ADDL: CPT | Performed by: PSYCHOLOGIST

## 2025-06-30 NOTE — GROUP NOTE
We discussed that the note will be visible and others healthcare practitioners will have access. The patient has consented to an unrestricted note due to working with a multidisciplinary team.    Virtual or Telephone Consent    An interactive audio and video telecommunication system which permits real time communications between the patient (at the originating site) and provider (at the distant site) was utilized to provide this telehealth service.   Verbal consent was requested and obtained from Augusto Garcia on this date, 07/01/25 for a telehealth visit and the patient's location was confirmed at the time of the visit.      Group Topic: Coping Skills   Group Date: 6/26/2025  Start Time:  5:30 PM  End Time:  7:00 PM  Facilitators: Tina White, PhD; Regina Donaldson DO   Department:  [facility]     Number of Participants: 8   Group Focus: other [facility] Education  Treatment Modality: Other: behavioral medicine  Interventions utilized were   Purpose: to provide education and answer questions about AOMs and MBS  NOTE: No Suicidal ideation or plan reported.   Virtual group guidelines was sent to all patients.  530-7pm  Name: Augusto Garcia YOB: 1975   MR: [id number]      Facilitator: Psychologist and Family Medicine Resident  Level of Participation: active  Quality of Participation: appropriate/pleasant  Interactions with others: appropriate and asked thoughtful questions  Mood/Affect: euthymic   Cognition: logical  Progress: Gaining insight or knowledge  Plan: continue with services

## 2025-08-05 DIAGNOSIS — E55.9 VITAMIN D DEFICIENCY: ICD-10-CM

## 2025-08-05 RX ORDER — VIT C/E/ZN/COPPR/LUTEIN/ZEAXAN 250MG-90MG
50 CAPSULE ORAL DAILY
Qty: 180 CAPSULE | Refills: 0 | Status: SHIPPED | OUTPATIENT
Start: 2025-08-05

## 2025-08-18 DIAGNOSIS — E78.5 HYPERLIPIDEMIA, UNSPECIFIED HYPERLIPIDEMIA TYPE: ICD-10-CM

## 2025-08-18 DIAGNOSIS — J31.0 CHRONIC RHINITIS: Primary | ICD-10-CM

## 2025-08-22 RX ORDER — ATORVASTATIN CALCIUM 10 MG/1
10 TABLET, FILM COATED ORAL
Qty: 90 TABLET | Refills: 1 | Status: SHIPPED | OUTPATIENT
Start: 2025-08-22

## 2025-08-22 RX ORDER — FLUTICASONE PROPIONATE 50 MCG
1 SPRAY, SUSPENSION (ML) NASAL DAILY
Qty: 16 G | Refills: 2 | Status: SHIPPED | OUTPATIENT
Start: 2025-08-22

## 2025-08-28 ENCOUNTER — APPOINTMENT (OUTPATIENT)
Dept: PRIMARY CARE | Facility: CLINIC | Age: 50
End: 2025-08-28
Payer: COMMERCIAL

## 2025-08-29 ENCOUNTER — APPOINTMENT (OUTPATIENT)
Dept: PRIMARY CARE | Facility: HOSPITAL | Age: 50
End: 2025-08-29
Payer: COMMERCIAL

## 2025-09-16 ENCOUNTER — APPOINTMENT (OUTPATIENT)
Dept: ENDOCRINOLOGY | Facility: CLINIC | Age: 50
End: 2025-09-16
Payer: COMMERCIAL

## 2025-09-26 ENCOUNTER — APPOINTMENT (OUTPATIENT)
Dept: PRIMARY CARE | Facility: CLINIC | Age: 50
End: 2025-09-26
Payer: COMMERCIAL

## 2025-10-03 ENCOUNTER — APPOINTMENT (OUTPATIENT)
Dept: ENDOCRINOLOGY | Facility: CLINIC | Age: 50
End: 2025-10-03
Payer: COMMERCIAL

## 2025-10-07 ENCOUNTER — APPOINTMENT (OUTPATIENT)
Dept: OBSTETRICS AND GYNECOLOGY | Facility: CLINIC | Age: 50
End: 2025-10-07
Payer: COMMERCIAL

## 2025-10-14 ENCOUNTER — APPOINTMENT (OUTPATIENT)
Dept: OBSTETRICS AND GYNECOLOGY | Facility: CLINIC | Age: 50
End: 2025-10-14
Payer: COMMERCIAL